# Patient Record
Sex: FEMALE | Race: WHITE | Employment: PART TIME | URBAN - METROPOLITAN AREA
[De-identification: names, ages, dates, MRNs, and addresses within clinical notes are randomized per-mention and may not be internally consistent; named-entity substitution may affect disease eponyms.]

---

## 2023-12-24 ENCOUNTER — APPOINTMENT (OUTPATIENT)
Dept: GENERAL RADIOLOGY | Age: 73
DRG: 070 | End: 2023-12-24
Payer: MEDICARE

## 2023-12-24 ENCOUNTER — HOSPITAL ENCOUNTER (INPATIENT)
Age: 73
LOS: 3 days | Discharge: HOME | DRG: 070 | End: 2023-12-28
Attending: EMERGENCY MEDICINE | Admitting: INTERNAL MEDICINE
Payer: MEDICARE

## 2023-12-24 ENCOUNTER — APPOINTMENT (OUTPATIENT)
Dept: CT IMAGING | Age: 73
DRG: 070 | End: 2023-12-24
Payer: MEDICARE

## 2023-12-24 DIAGNOSIS — G03.9 MENINGITIS: ICD-10-CM

## 2023-12-24 DIAGNOSIS — I67.83 POSTERIOR REVERSIBLE ENCEPHALOPATHY SYNDROME: ICD-10-CM

## 2023-12-24 DIAGNOSIS — R44.3 HALLUCINATION: Primary | ICD-10-CM

## 2023-12-24 LAB
ALBUMIN SERPL-MCNC: 4 G/DL (ref 3.5–5.2)
ALP SERPL-CCNC: 86 U/L (ref 35–104)
ALT SERPL-CCNC: 11 U/L (ref 0–32)
AMPHET UR QL SCN: NEGATIVE
ANION GAP SERPL CALCULATED.3IONS-SCNC: 12 MMOL/L (ref 7–16)
APAP SERPL-MCNC: <5 UG/ML (ref 10–30)
AST SERPL-CCNC: 14 U/L (ref 0–31)
B PARAP IS1001 DNA NPH QL NAA+NON-PROBE: NOT DETECTED
B PERT DNA SPEC QL NAA+PROBE: NOT DETECTED
BACTERIA URNS QL MICRO: ABNORMAL
BARBITURATES UR QL SCN: NEGATIVE
BASOPHILS # BLD: 0.01 K/UL (ref 0–0.2)
BASOPHILS NFR BLD: 0 % (ref 0–2)
BENZODIAZ UR QL: NEGATIVE
BILIRUB SERPL-MCNC: 0.3 MG/DL (ref 0–1.2)
BILIRUB UR QL STRIP: NEGATIVE
BUN SERPL-MCNC: 9 MG/DL (ref 6–23)
BUPRENORPHINE UR QL: NEGATIVE
C PNEUM DNA NPH QL NAA+NON-PROBE: NOT DETECTED
CALCIUM SERPL-MCNC: 9.1 MG/DL (ref 8.6–10.2)
CANNABINOIDS UR QL SCN: NEGATIVE
CHLORIDE SERPL-SCNC: 100 MMOL/L (ref 98–107)
CLARITY UR: CLEAR
CO2 SERPL-SCNC: 26 MMOL/L (ref 22–29)
COCAINE UR QL SCN: NEGATIVE
COLOR UR: YELLOW
CREAT SERPL-MCNC: 0.7 MG/DL (ref 0.5–1)
CRYSTALS URNS MICRO: ABNORMAL /HPF
EOSINOPHIL # BLD: 0.44 K/UL (ref 0.05–0.5)
EOSINOPHILS RELATIVE PERCENT: 6 % (ref 0–6)
ERYTHROCYTE [DISTWIDTH] IN BLOOD BY AUTOMATED COUNT: 12.9 % (ref 11.5–15)
ETHANOLAMINE SERPL-MCNC: <10 MG/DL
FENTANYL UR QL: NEGATIVE
FLUAV RNA NPH QL NAA+NON-PROBE: NOT DETECTED
FLUBV RNA NPH QL NAA+NON-PROBE: NOT DETECTED
GFR SERPL CREATININE-BSD FRML MDRD: >60 ML/MIN/1.73M2
GLUCOSE SERPL-MCNC: 118 MG/DL (ref 74–99)
GLUCOSE UR STRIP-MCNC: NEGATIVE MG/DL
HADV DNA NPH QL NAA+NON-PROBE: NOT DETECTED
HCOV 229E RNA NPH QL NAA+NON-PROBE: NOT DETECTED
HCOV HKU1 RNA NPH QL NAA+NON-PROBE: NOT DETECTED
HCOV NL63 RNA NPH QL NAA+NON-PROBE: NOT DETECTED
HCOV OC43 RNA NPH QL NAA+NON-PROBE: NOT DETECTED
HCT VFR BLD AUTO: 39.7 % (ref 34–48)
HGB BLD-MCNC: 14 G/DL (ref 11.5–15.5)
HGB UR QL STRIP.AUTO: ABNORMAL
HMPV RNA NPH QL NAA+NON-PROBE: NOT DETECTED
HPIV1 RNA NPH QL NAA+NON-PROBE: NOT DETECTED
HPIV2 RNA NPH QL NAA+NON-PROBE: NOT DETECTED
HPIV3 RNA NPH QL NAA+NON-PROBE: NOT DETECTED
HPIV4 RNA NPH QL NAA+NON-PROBE: NOT DETECTED
IMM GRANULOCYTES # BLD AUTO: 0.03 K/UL (ref 0–0.58)
IMM GRANULOCYTES NFR BLD: 0 % (ref 0–5)
KETONES UR STRIP-MCNC: NEGATIVE MG/DL
LACTATE BLDV-SCNC: 1.3 MMOL/L (ref 0.5–2.2)
LEUKOCYTE ESTERASE UR QL STRIP: ABNORMAL
LYMPHOCYTES NFR BLD: 1.53 K/UL (ref 1.5–4)
LYMPHOCYTES RELATIVE PERCENT: 21 % (ref 20–42)
M PNEUMO DNA NPH QL NAA+NON-PROBE: NOT DETECTED
MCH RBC QN AUTO: 30.4 PG (ref 26–35)
MCHC RBC AUTO-ENTMCNC: 35.3 G/DL (ref 32–34.5)
MCV RBC AUTO: 86.1 FL (ref 80–99.9)
METHADONE UR QL: NEGATIVE
MONOCYTES NFR BLD: 0.55 K/UL (ref 0.1–0.95)
MONOCYTES NFR BLD: 8 % (ref 2–12)
NEUTROPHILS NFR BLD: 64 % (ref 43–80)
NEUTS SEG NFR BLD: 4.59 K/UL (ref 1.8–7.3)
NITRITE UR QL STRIP: NEGATIVE
OPIATES UR QL SCN: NEGATIVE
OXYCODONE UR QL SCN: NEGATIVE
PCP UR QL SCN: NEGATIVE
PH UR STRIP: 6.5 [PH] (ref 5–9)
PLATELET # BLD AUTO: 252 K/UL (ref 130–450)
PMV BLD AUTO: 9.6 FL (ref 7–12)
POTASSIUM SERPL-SCNC: 3.4 MMOL/L (ref 3.5–5)
PROT SERPL-MCNC: 6.7 G/DL (ref 6.4–8.3)
PROT UR STRIP-MCNC: NEGATIVE MG/DL
RBC # BLD AUTO: 4.61 M/UL (ref 3.5–5.5)
RBC #/AREA URNS HPF: ABNORMAL /HPF
RSV RNA NPH QL NAA+NON-PROBE: NOT DETECTED
RV+EV RNA NPH QL NAA+NON-PROBE: NOT DETECTED
SALICYLATES SERPL-MCNC: <0.3 MG/DL (ref 0–30)
SARS-COV-2 RNA NPH QL NAA+NON-PROBE: NOT DETECTED
SODIUM SERPL-SCNC: 138 MMOL/L (ref 132–146)
SP GR UR STRIP: 1.01 (ref 1–1.03)
SPECIMEN DESCRIPTION: NORMAL
TEST INFORMATION: NORMAL
TOXIC TRICYCLIC SC,BLOOD: NEGATIVE
TROPONIN I SERPL HS-MCNC: 11 NG/L (ref 0–9)
UROBILINOGEN UR STRIP-ACNC: 0.2 EU/DL (ref 0–1)
WBC #/AREA URNS HPF: ABNORMAL /HPF
WBC OTHER # BLD: 7.2 K/UL (ref 4.5–11.5)

## 2023-12-24 PROCEDURE — 99285 EMERGENCY DEPT VISIT HI MDM: CPT

## 2023-12-24 PROCEDURE — 80179 DRUG ASSAY SALICYLATE: CPT

## 2023-12-24 PROCEDURE — 96375 TX/PRO/DX INJ NEW DRUG ADDON: CPT

## 2023-12-24 PROCEDURE — 96367 TX/PROPH/DG ADDL SEQ IV INF: CPT

## 2023-12-24 PROCEDURE — 85025 COMPLETE CBC W/AUTO DIFF WBC: CPT

## 2023-12-24 PROCEDURE — G0480 DRUG TEST DEF 1-7 CLASSES: HCPCS

## 2023-12-24 PROCEDURE — 80307 DRUG TEST PRSMV CHEM ANLYZR: CPT

## 2023-12-24 PROCEDURE — 81001 URINALYSIS AUTO W/SCOPE: CPT

## 2023-12-24 PROCEDURE — 009Y3ZX DRAINAGE OF LUMBAR SPINAL CORD, PERCUTANEOUS APPROACH, DIAGNOSTIC: ICD-10-PCS

## 2023-12-24 PROCEDURE — 71045 X-RAY EXAM CHEST 1 VIEW: CPT

## 2023-12-24 PROCEDURE — 0202U NFCT DS 22 TRGT SARS-COV-2: CPT

## 2023-12-24 PROCEDURE — 70450 CT HEAD/BRAIN W/O DYE: CPT

## 2023-12-24 PROCEDURE — 93005 ELECTROCARDIOGRAM TRACING: CPT | Performed by: EMERGENCY MEDICINE

## 2023-12-24 PROCEDURE — 83605 ASSAY OF LACTIC ACID: CPT

## 2023-12-24 PROCEDURE — 80053 COMPREHEN METABOLIC PANEL: CPT

## 2023-12-24 PROCEDURE — 96365 THER/PROPH/DIAG IV INF INIT: CPT

## 2023-12-24 PROCEDURE — 84484 ASSAY OF TROPONIN QUANT: CPT

## 2023-12-24 PROCEDURE — 80143 DRUG ASSAY ACETAMINOPHEN: CPT

## 2023-12-24 PROCEDURE — 6360000002 HC RX W HCPCS: Performed by: EMERGENCY MEDICINE

## 2023-12-24 RX ORDER — POTASSIUM CHLORIDE 7.45 MG/ML
10 INJECTION INTRAVENOUS ONCE
Status: COMPLETED | OUTPATIENT
Start: 2023-12-24 | End: 2023-12-24

## 2023-12-24 RX ADMIN — POTASSIUM CHLORIDE 10 MEQ: 7.46 INJECTION, SOLUTION INTRAVENOUS at 21:52

## 2023-12-24 ASSESSMENT — LIFESTYLE VARIABLES
HOW MANY STANDARD DRINKS CONTAINING ALCOHOL DO YOU HAVE ON A TYPICAL DAY: 3 OR 4
HOW OFTEN DO YOU HAVE A DRINK CONTAINING ALCOHOL: 2-4 TIMES A MONTH

## 2023-12-24 NOTE — ED PROVIDER NOTES
Department of Emergency Medicine   ED  Provider Note  Admit Date/RoomTime: 12/24/2023  8:00 PM  ED Room: [facility]          History of Present Illness:  12/24/23, Time: 4:38 PM EST  Chief Complaint   Patient presents with    Medication Reaction     Having visual hallucinations after being started on Augmentin. She also got a rash on her chest after starting Augmentin                 Hilary Sahni is a 72 y.o. female presenting to the ED for hallucinations. Patient's been hallucinating the past 3 days. She is seeing things that are not there. She says she saw cardboard floating this morning. She says she thought her pillow was walking across the. She says she thought she saw 3G written across the wall when walking to the ER. She never had this before. She was started on Augmentin 2 and half days ago secondary to a sinus infection. She said it started after the first dose of Augmentin. She stopped taking Augmentin yesterday. No fevers or chills. No neck pain or stiffness. No nausea or vomiting. She says she has an area of redness over her anterior chest was also started after the Augmentin. Spouse is bedside, states the patient is otherwise at her baseline and does not appear any different. He does not feel that she is confused at this time. No other symptoms or complaints. Patient took no antipyretics prior to arrival.    Review of Systems:   Pertinent positives and negatives are stated within HPI, all other systems reviewed and are negative.        --------------------------------------------- PAST HISTORY ---------------------------------------------  Past Medical History:  has no past medical history on file. Past Surgical History:  has no past surgical history on file. Social History:  reports that she has never smoked. She has never used smokeless tobacco. She reports current alcohol use. She reports that she does not use drugs. Family History: family history is not on file. . Unless /HPF    Crystals, UA 2+ CALCIUM OXALATE (A) None /HPF    Bacteria, UA 2+ (A) None   EKG 12 Lead   Result Value Ref Range    Ventricular Rate 68 BPM    Atrial Rate 68 BPM    P-R Interval 130 ms    QRS Duration 72 ms    Q-T Interval 386 ms    QTc Calculation (Bazett) 410 ms    P Axis 46 degrees    R Axis 15 degrees    T Axis -3 degrees   ,       RADIOLOGY:  Interpreted by Radiologist unless otherwise specified  CT HEAD WO CONTRAST   Final Result   No acute intracranial abnormality.         XR CHEST PORTABLE   Final Result   No acute process.               ------------------------- NURSING NOTES AND VITALS REVIEWED ---------------------------   The nursing notes within the ED encounter and vital signs as below have been reviewed by myself  BP (!) 147/81   Pulse 71   Temp 97.2 °F (36.2 °C)   Resp 20   Ht 1.499 m (4' 11\")   Wt 61.2 kg (135 lb)   SpO2 98%   BMI 27.27 kg/m²     Oxygen Saturation Interpretation: Normal    The patient’s available past medical records and past encounters were reviewed.        ------------------------------ ED COURSE/MEDICAL DECISION MAKING----------------------  Medications   potassium chloride 10 mEq/100 mL IVPB (Peripheral Line) (0 mEq IntraVENous Stopped 12/24/23 2319)         Medical Decision Making:     Patient presents with hallucinations.  Concern for tumor, medication reaction, hypoglycemia, infection, or other pathologies.  She was hemodynamically stable, afebrile, overall well-appearing on arrival.  NIH was 0.  She had no confusion.  She had a vague area of erythema over her sternum, but no true rash otherwise.    EKG interpreted by me shows sinus rhythm, rate 68, no STEMI, no ischemic change    Laboratories shows normal CBC, CMP, serum and urine drug screen, lactic negative, urinalysis not consistent with UTI    Chest x-ray inter myself shows no pneumothorax, radiology agrees, CT reviewed as above    MRI is pending at this time.  If MRI normal, patient is medically cleared.

## 2023-12-25 ENCOUNTER — APPOINTMENT (OUTPATIENT)
Dept: MRI IMAGING | Age: 73
DRG: 070 | End: 2023-12-25
Payer: MEDICARE

## 2023-12-25 ENCOUNTER — APPOINTMENT (OUTPATIENT)
Dept: CT IMAGING | Age: 73
DRG: 070 | End: 2023-12-25
Payer: MEDICARE

## 2023-12-25 PROBLEM — A87.9 VIRAL MENINGITIS: Status: ACTIVE | Noted: 2023-12-25

## 2023-12-25 LAB
ANION GAP SERPL CALCULATED.3IONS-SCNC: 13 MMOL/L (ref 7–16)
APPEARANCE CSF: CLEAR
APPEARANCE CSF: CLEAR
BASOPHILS # BLD: 0.02 K/UL (ref 0–0.2)
BASOPHILS NFR BLD: 0 % (ref 0–2)
BUN SERPL-MCNC: 8 MG/DL (ref 6–23)
C GATTII+NEOFOR DNA CSF QL NAA+NON-PROBE: NOT DETECTED
CALCIUM SERPL-MCNC: 8.8 MG/DL (ref 8.6–10.2)
CHLORIDE SERPL-SCNC: 99 MMOL/L (ref 98–107)
CLOT CHECK: ABNORMAL
CLOT CHECK: ABNORMAL
CMV DNA CSF QL NAA+NON-PROBE: NOT DETECTED
CO2 SERPL-SCNC: 23 MMOL/L (ref 22–29)
COLOR CSF: ABNORMAL
COLOR CSF: ABNORMAL
CREAT SERPL-MCNC: 0.6 MG/DL (ref 0.5–1)
E COLI K1 DNA CSF QL NAA+NON-PROBE: NOT DETECTED
EOSINOPHIL # BLD: 0.31 K/UL (ref 0.05–0.5)
EOSINOPHILS RELATIVE PERCENT: 5 % (ref 0–6)
ERYTHROCYTE [DISTWIDTH] IN BLOOD BY AUTOMATED COUNT: 13 % (ref 11.5–15)
EV RNA CSF QL NAA+NON-PROBE: NOT DETECTED
GFR SERPL CREATININE-BSD FRML MDRD: >60 ML/MIN/1.73M2
GLUCOSE CSF-MCNC: 45 MG/DL (ref 40–70)
GLUCOSE SERPL-MCNC: 89 MG/DL (ref 74–99)
GP B STREP DNA CSF QL NAA+NON-PROBE: NOT DETECTED
HAEM INFLU DNA CSF QL NAA+NON-PROBE: NOT DETECTED
HCT VFR BLD AUTO: 37.7 % (ref 34–48)
HGB BLD-MCNC: 13.1 G/DL (ref 11.5–15.5)
HHV6 DNA CSF QL NAA+NON-PROBE: NOT DETECTED
HSV1 DNA CSF QL NAA+NON-PROBE: NOT DETECTED
HSV2 DNA CSF QL NAA+NON-PROBE: NOT DETECTED
IMM GRANULOCYTES # BLD AUTO: 0.08 K/UL (ref 0–0.58)
IMM GRANULOCYTES NFR BLD: 1 % (ref 0–5)
L MONOCYTOG DNA CSF QL NAA+NON-PROBE: NOT DETECTED
LACTATE BLDV-SCNC: 0.9 MMOL/L (ref 0.5–1.9)
LYMPHOCYTES NFR BLD: 1.75 K/UL (ref 1.5–4)
LYMPHOCYTES RELATIVE PERCENT: 25 % (ref 20–42)
MCH RBC QN AUTO: 30.3 PG (ref 26–35)
MCHC RBC AUTO-ENTMCNC: 34.7 G/DL (ref 32–34.5)
MCV RBC AUTO: 87.1 FL (ref 80–99.9)
MONOCYTES NFR BLD: 0.45 K/UL (ref 0.1–0.95)
MONOCYTES NFR BLD: 7 % (ref 2–12)
MONOCYTES, CSF: 98 % (ref 15–45)
N MEN DNA CSF QL NAA+NON-PROBE: NOT DETECTED
NEUTROPHILS NFR BLD: 62 % (ref 43–80)
NEUTROPHILS NFR CSF: 2 % (ref 0–6)
NEUTS SEG NFR BLD: 4.27 K/UL (ref 1.8–7.3)
NUC CELL # FLD MANUAL: 56 CELLS/UL (ref 0–5)
NUC CELL # FLD MANUAL: 63 CELLS/UL (ref 0–5)
PARECHOVIRUS A RNA CSF QL NAA+NON-PROBE: NOT DETECTED
PLATELET CONFIRMATION: NORMAL
PLATELET, FLUORESCENCE: 213 K/UL (ref 130–450)
PMV BLD AUTO: 10.1 FL (ref 7–12)
POTASSIUM SERPL-SCNC: 3.7 MMOL/L (ref 3.5–5)
PROT CSF-MCNC: 193.4 MG/DL (ref 15–40)
PROT CSF-MCNC: 197.3 MG/DL (ref 15–40)
RBC # BLD AUTO: 4.33 M/UL (ref 3.5–5.5)
RBC # FLD MANUAL: <2000 CELLS/UL
RBC # FLD MANUAL: <2000 CELLS/UL
S PNEUM DNA CSF QL NAA+NON-PROBE: NOT DETECTED
SODIUM SERPL-SCNC: 135 MMOL/L (ref 132–146)
SPECIMEN DESCRIPTION: NORMAL
SPECIMEN VOL CSF: ABNORMAL ML
SPECIMEN VOL CSF: ABNORMAL ML
TUBE # CSF: 1
TUBE # CSF: 4
VZV DNA CSF QL NAA+NON-PROBE: NOT DETECTED
WBC OTHER # BLD: 6.9 K/UL (ref 4.5–11.5)

## 2023-12-25 PROCEDURE — 2580000003 HC RX 258: Performed by: STUDENT IN AN ORGANIZED HEALTH CARE EDUCATION/TRAINING PROGRAM

## 2023-12-25 PROCEDURE — 80048 BASIC METABOLIC PNL TOTAL CA: CPT

## 2023-12-25 PROCEDURE — 6360000004 HC RX CONTRAST MEDICATION: Performed by: RADIOLOGY

## 2023-12-25 PROCEDURE — 83605 ASSAY OF LACTIC ACID: CPT

## 2023-12-25 PROCEDURE — 86592 SYPHILIS TEST NON-TREP QUAL: CPT

## 2023-12-25 PROCEDURE — 87070 CULTURE OTHR SPECIMN AEROBIC: CPT

## 2023-12-25 PROCEDURE — 36415 COLL VENOUS BLD VENIPUNCTURE: CPT

## 2023-12-25 PROCEDURE — 87015 SPECIMEN INFECT AGNT CONCNTJ: CPT

## 2023-12-25 PROCEDURE — 6360000002 HC RX W HCPCS: Performed by: STUDENT IN AN ORGANIZED HEALTH CARE EDUCATION/TRAINING PROGRAM

## 2023-12-25 PROCEDURE — 87205 SMEAR GRAM STAIN: CPT

## 2023-12-25 PROCEDURE — 70498 CT ANGIOGRAPHY NECK: CPT

## 2023-12-25 PROCEDURE — 70496 CT ANGIOGRAPHY HEAD: CPT

## 2023-12-25 PROCEDURE — A9577 INJ MULTIHANCE: HCPCS | Performed by: RADIOLOGY

## 2023-12-25 PROCEDURE — 88108 CYTOPATH CONCENTRATE TECH: CPT

## 2023-12-25 PROCEDURE — 84157 ASSAY OF PROTEIN OTHER: CPT

## 2023-12-25 PROCEDURE — 87483 CNS DNA AMP PROBE TYPE 12-25: CPT

## 2023-12-25 PROCEDURE — 70553 MRI BRAIN STEM W/O & W/DYE: CPT

## 2023-12-25 PROCEDURE — 85025 COMPLETE CBC W/AUTO DIFF WBC: CPT

## 2023-12-25 PROCEDURE — 89051 BODY FLUID CELL COUNT: CPT

## 2023-12-25 PROCEDURE — 87040 BLOOD CULTURE FOR BACTERIA: CPT

## 2023-12-25 PROCEDURE — 2060000000 HC ICU INTERMEDIATE R&B

## 2023-12-25 PROCEDURE — 82945 GLUCOSE OTHER FLUID: CPT

## 2023-12-25 RX ORDER — DEXTROSE MONOHYDRATE 100 MG/ML
INJECTION, SOLUTION INTRAVENOUS CONTINUOUS PRN
Status: DISCONTINUED | OUTPATIENT
Start: 2023-12-25 | End: 2023-12-25 | Stop reason: SDUPTHER

## 2023-12-25 RX ORDER — HYDRALAZINE HYDROCHLORIDE 20 MG/ML
10 INJECTION INTRAMUSCULAR; INTRAVENOUS EVERY 6 HOURS PRN
Status: DISCONTINUED | OUTPATIENT
Start: 2023-12-25 | End: 2023-12-28 | Stop reason: HOSPADM

## 2023-12-25 RX ORDER — ONDANSETRON 4 MG/1
4 TABLET, ORALLY DISINTEGRATING ORAL EVERY 8 HOURS PRN
Status: DISCONTINUED | OUTPATIENT
Start: 2023-12-25 | End: 2023-12-28 | Stop reason: HOSPADM

## 2023-12-25 RX ORDER — ONDANSETRON 2 MG/ML
4 INJECTION INTRAMUSCULAR; INTRAVENOUS EVERY 6 HOURS PRN
Status: DISCONTINUED | OUTPATIENT
Start: 2023-12-25 | End: 2023-12-28 | Stop reason: HOSPADM

## 2023-12-25 RX ORDER — SODIUM CHLORIDE 9 MG/ML
INJECTION, SOLUTION INTRAVENOUS PRN
Status: DISCONTINUED | OUTPATIENT
Start: 2023-12-25 | End: 2023-12-28 | Stop reason: HOSPADM

## 2023-12-25 RX ORDER — SODIUM CHLORIDE 9 MG/ML
INJECTION, SOLUTION INTRAVENOUS CONTINUOUS
Status: DISCONTINUED | OUTPATIENT
Start: 2023-12-25 | End: 2023-12-27

## 2023-12-25 RX ORDER — FENTANYL CITRATE 50 UG/ML
50 INJECTION, SOLUTION INTRAMUSCULAR; INTRAVENOUS ONCE
Status: DISCONTINUED | OUTPATIENT
Start: 2023-12-25 | End: 2023-12-25

## 2023-12-25 RX ORDER — DEXTROSE MONOHYDRATE 100 MG/ML
INJECTION, SOLUTION INTRAVENOUS CONTINUOUS PRN
Status: DISCONTINUED | OUTPATIENT
Start: 2023-12-25 | End: 2023-12-28 | Stop reason: HOSPADM

## 2023-12-25 RX ORDER — METOPROLOL TARTRATE 1 MG/ML
10 INJECTION, SOLUTION INTRAVENOUS EVERY 6 HOURS PRN
Status: DISCONTINUED | OUTPATIENT
Start: 2023-12-25 | End: 2023-12-25

## 2023-12-25 RX ORDER — MIDAZOLAM HYDROCHLORIDE 2 MG/2ML
1 INJECTION, SOLUTION INTRAMUSCULAR; INTRAVENOUS ONCE
Status: COMPLETED | OUTPATIENT
Start: 2023-12-25 | End: 2023-12-25

## 2023-12-25 RX ORDER — SODIUM CHLORIDE 0.9 % (FLUSH) 0.9 %
5-40 SYRINGE (ML) INJECTION PRN
Status: DISCONTINUED | OUTPATIENT
Start: 2023-12-25 | End: 2023-12-28 | Stop reason: HOSPADM

## 2023-12-25 RX ORDER — SODIUM CHLORIDE 0.9 % (FLUSH) 0.9 %
5-40 SYRINGE (ML) INJECTION EVERY 12 HOURS SCHEDULED
Status: DISCONTINUED | OUTPATIENT
Start: 2023-12-25 | End: 2023-12-28 | Stop reason: HOSPADM

## 2023-12-25 RX ORDER — LABETALOL HYDROCHLORIDE 5 MG/ML
10 INJECTION, SOLUTION INTRAVENOUS EVERY 6 HOURS PRN
Status: DISCONTINUED | OUTPATIENT
Start: 2023-12-25 | End: 2023-12-28 | Stop reason: HOSPADM

## 2023-12-25 RX ORDER — POLYETHYLENE GLYCOL 3350 17 G/17G
17 POWDER, FOR SOLUTION ORAL DAILY PRN
Status: DISCONTINUED | OUTPATIENT
Start: 2023-12-25 | End: 2023-12-28 | Stop reason: HOSPADM

## 2023-12-25 RX ADMIN — CEFTRIAXONE SODIUM 2000 MG: 2 INJECTION, POWDER, FOR SOLUTION INTRAMUSCULAR; INTRAVENOUS at 13:56

## 2023-12-25 RX ADMIN — ACYCLOVIR SODIUM 500 MG: 50 INJECTION, SOLUTION INTRAVENOUS at 15:35

## 2023-12-25 RX ADMIN — MIDAZOLAM 1 MG: 1 INJECTION INTRAMUSCULAR; INTRAVENOUS at 12:50

## 2023-12-25 RX ADMIN — AMPICILLIN SODIUM 2000 MG: 2 INJECTION, POWDER, FOR SOLUTION INTRAVENOUS at 14:07

## 2023-12-25 RX ADMIN — VANCOMYCIN HYDROCHLORIDE 1250 MG: 10 INJECTION, POWDER, LYOPHILIZED, FOR SOLUTION INTRAVENOUS at 15:25

## 2023-12-25 RX ADMIN — IOPAMIDOL 75 ML: 755 INJECTION, SOLUTION INTRAVENOUS at 17:17

## 2023-12-25 RX ADMIN — GADOBENATE DIMEGLUMINE 12 ML: 529 INJECTION, SOLUTION INTRAVENOUS at 07:54

## 2023-12-25 NOTE — PROCEDURES
Lumbar Puncture Procedure Note    Indication: Suspected meningitis    Consent: The patient provided verbal consent for this procedure. Procedure: The patient was placed in the right lateral decubitus position and the appropriate landmarks were identified. The area was prepped and draped in the usual sterile fashion. Anesthesia was obtained using 3 cc of 1% Lidocaine without epinephrine. A spinal needle was inserted at the L4- L5 level with the stylet in place until spinal fluid was returned. Opening pressure was not measured. At this point 6.0 cc of straw colored spinal fluid was obtained and sent for appropriate testing. The stylet was then replaced and the needle was withdrawn. A sterile dressing was placed over the site and the patient was placed in the supine position. The patient tolerated the procedure well.     Complications: None

## 2023-12-25 NOTE — CONSULTS
Department of Internal Medicine  Infectious Diseases   Consult Note      Reason for Consult:  Meningitis       Requesting Physician:  Dr Nguyen Meth:      This is a 72 yrs old female presented to the ER with visual hallucination . Pt stated that she recently visited [facility] ( for 2 weeks ) and came back home on Dec 6 th. Pt remembered feeling sick ( chills , weakness ) on the way back home ( on Dec 5 th ) - she tested positive for COVID 19 on Dec 7 th - treated with steroid ( sounds like medrol dose pack ) - she felt a bit better . She subsequently visited [facility] on Friday - she was prescribed augmentin for \" sinus infection \". She developed upset stomach, nausea,vomiting . Reported headache and visual hallucination . 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 No pertinent past medical history. Past Surgical History:      History reviewed. No pertinent surgical history.       Current Medications:      Current Facility-Administered Medications   Medication Dose Route Frequency Provider Last Rate Last Admin    vancomycin (VANCOCIN) 1,250 mg in sodium chloride 0.9 % 250 mL IVPB  20 mg/kg IntraVENous Once Jeanmarie, Alcides, DO        cefTRIAXone (ROCEPHIN) 2,000 mg in sterile water 20 mL IV syringe  2,000 mg IntraVENous Once Jeanmarie, Alcides, DO        ampicillin (OMNIPEN) 2,000 mg in sodium chloride 0.9 % 100 mL IVPB ([id number])  2,000 mg IntraVENous Once Jeanmarie, Alcides, DO        acyclovir (ZOVIRAX) 500 mg in sodium chloride 0.9 % 100 mL IVPB  10 mg/kg (Ideal) IntraVENous Once Jeanmarie, Alcides, DO        fentaNYL (SUBLIMAZE) injection 50 mcg  50 mcg IntraVENous Once Jeanmarie, Alcides, DO        midazolam PF (VERSED) injection 1 mg  1 mg IntraVENous Once Charmayne Gu, DO         No

## 2023-12-25 NOTE — H&P
[facility]  Internal Medicine Residency Program  History and Physical    Patient:  Romana Tanner 73 y.o. female   MRN: [id number]       Date of Service: 12/25/2023        Chief complaint: had concerns including Medication Reaction (Having visual hallucinations after being started on Augmentin. She also got a rash on her chest after starting Augmentin ).    History of Present Illness   The patient is a 73 y.o. female , presented with visual hallucinations, HA, feeling tired x 3 days. Pt believes it may be caused by Augmentin that she was prescribed for a sinus infection as the hallucinations started after. Pt also had vomiting, abdominal pain and diarrhea when she took it. (Seen at [facility]) Stopped taking Augmentin 12/24.   Visual hallucinations consist of writing on the walls, bright colors, words, numbers, and designs, she has not experienced this before, knows the hallucinations are not real. Does not have mental illness history.     Recent travel to [facility] for 2 weeks, felt sick on the way back home December 5th, subsequently tested positive for COVID-19 December 7th, was given a Z-pack.   Occasional drinker, 1-2 beers weekly. Works at [employer]. Not a smoker. No drug use.     Takes 2 blood pressure medications OP, she believes they are propranolol and amlodipine- to bring in medications to confirm as they're not on chart review. Did not take either BP pills today. Has struggled to control her BP for awhile. Knows her BP is high when she gets HA and blurred vision, has been meaning to get her BP meds adjusted but hasn't been to see her PCP.     No fevers, chills, CP, SOB, cough, N/V/D since friday, no abdominal pain, no SI/HI, no auditory hallucinations.     ED Course: Pt hemodynamically stable, afebrile. Concern for meningitis, LP performed, CSF studies sent. Pt tolerated well. Consulted ID and Neurology. Started on empiric antibiotic coverage vanc, rocephin, ampicillin and acyclovir.  Plan:    Visual hallucinations 2/2 meningitis vs antibiotic intolerance vs SAH  UDS SDS negative. CT head no acute. MRI leptomeningeal enhancement meningitis vs underlying subarachnoid blood/proteinaceous material.   Neurology, ID consulted in ED. S/p LP 12/25. Continue empiric antibiotics per ID. Follow CSF studies, remainder of infectious workup. Afebrile, no leukocytosis. Meningitis panel negative. Xanthrochromia, increased protein, nl glucose, glucose CSF/serum ratio ~0.5, monocyte predominance 98, with 63 WBCs in Tube 1, suggestive of SAH. Follow CTA head and neck. HTN    to bring in home medications to verify - possible on amlodipine and propanolol. PRNs for now. Hematuria, bacteruria, calcium oxalate crystals   Asymptomatic. PT/OT evaluation: not indicated at this time   DVT prophylaxis: PCDs  GI prophylaxis: not indicated   Diet:   ADULT DIET; Regular   Bowel regimen: PRN   Pain management: as needed  Code status: Full Code   Disposition: Admit to [place] [facility]   Family: updated as available    Karlos Caceres MD, PGY-1   Attending physician: Dr. Jorge Rangel  Internal Medicine Residency / [place] to resident note  Attending Physician Statement:  Andrés Bergeron M.D., F.A.C.P. Hospital charts reviewed, including other providers notes, relevant labs and imaging. Coordinating care with residents, other providers and/or counseling patient    I have seen patient/discussed the history and PE with the resident, and I agree with workup/plan and orders as documented by the resident.   (billing based on complexity of Medical decision making)  My Assessment as follows:      HTN hx    Travelled back from [place]- start feeling \"sick\" early Dec   Tested covid+  Zpack given-- then claims feeling back to normal  Till one week ago-- sinus pressure/pain  [facility] doc gave augmentin- took for 3 days  Notices \"new colors floatering around\"-- b/l eye
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
## 2023-12-25 NOTE — ED NOTES
Patient states to this RN that is experiencing visual hallucinations. Patient states she knows that they are not real. Patient states she is not seeing people, but is seeing flashes of numbers, letters, words. Patient denies any SI/HI behavior.

## 2023-12-26 ENCOUNTER — APPOINTMENT (OUTPATIENT)
Dept: CT IMAGING | Age: 73
DRG: 070 | End: 2023-12-26
Attending: PSYCHIATRY & NEUROLOGY
Payer: MEDICARE

## 2023-12-26 PROBLEM — R44.3 HALLUCINATION: Status: ACTIVE | Noted: 2023-12-26

## 2023-12-26 LAB
ANION GAP SERPL CALCULATED.3IONS-SCNC: 16 MMOL/L (ref 7–16)
BASOPHILS # BLD: 0.03 K/UL (ref 0–0.2)
BASOPHILS NFR BLD: 0 % (ref 0–2)
BUN SERPL-MCNC: 8 MG/DL (ref 6–23)
CALCIUM SERPL-MCNC: 8.9 MG/DL (ref 8.6–10.2)
CHLORIDE SERPL-SCNC: 99 MMOL/L (ref 98–107)
CO2 SERPL-SCNC: 23 MMOL/L (ref 22–29)
CREAT SERPL-MCNC: 0.6 MG/DL (ref 0.5–1)
EKG ATRIAL RATE: 68 BPM
EKG P AXIS: 46 DEGREES
EKG P-R INTERVAL: 130 MS
EKG Q-T INTERVAL: 386 MS
EKG QRS DURATION: 72 MS
EKG QTC CALCULATION (BAZETT): 410 MS
EKG R AXIS: 15 DEGREES
EKG T AXIS: -3 DEGREES
EKG VENTRICULAR RATE: 68 BPM
EOSINOPHIL # BLD: 0.17 K/UL (ref 0.05–0.5)
EOSINOPHILS RELATIVE PERCENT: 2 % (ref 0–6)
ERYTHROCYTE [DISTWIDTH] IN BLOOD BY AUTOMATED COUNT: 12.8 % (ref 11.5–15)
GFR SERPL CREATININE-BSD FRML MDRD: >60 ML/MIN/1.73M2
GLUCOSE SERPL-MCNC: 120 MG/DL (ref 74–99)
HCT VFR BLD AUTO: 38.7 % (ref 34–48)
HGB BLD-MCNC: 13.5 G/DL (ref 11.5–15.5)
IMM GRANULOCYTES # BLD AUTO: 0.04 K/UL (ref 0–0.58)
IMM GRANULOCYTES NFR BLD: 1 % (ref 0–5)
LYMPHOCYTES NFR BLD: 1.66 K/UL (ref 1.5–4)
LYMPHOCYTES RELATIVE PERCENT: 23 % (ref 20–42)
MCH RBC QN AUTO: 30.1 PG (ref 26–35)
MCHC RBC AUTO-ENTMCNC: 34.9 G/DL (ref 32–34.5)
MCV RBC AUTO: 86.4 FL (ref 80–99.9)
MONOCYTES NFR BLD: 0.38 K/UL (ref 0.1–0.95)
MONOCYTES NFR BLD: 5 % (ref 2–12)
MONOCYTES, CSF: 95 % (ref 15–45)
NEUTROPHILS NFR BLD: 68 % (ref 43–80)
NEUTROPHILS NFR CSF: 5 % (ref 0–6)
NEUTS SEG NFR BLD: 4.94 K/UL (ref 1.8–7.3)
PLATELET # BLD AUTO: 239 K/UL (ref 130–450)
PMV BLD AUTO: 10 FL (ref 7–12)
POTASSIUM SERPL-SCNC: 3.4 MMOL/L (ref 3.5–5)
RBC # BLD AUTO: 4.48 M/UL (ref 3.5–5.5)
SODIUM SERPL-SCNC: 138 MMOL/L (ref 132–146)
WBC OTHER # BLD: 7.2 K/UL (ref 4.5–11.5)

## 2023-12-26 PROCEDURE — 80048 BASIC METABOLIC PNL TOTAL CA: CPT

## 2023-12-26 PROCEDURE — 6370000000 HC RX 637 (ALT 250 FOR IP)

## 2023-12-26 PROCEDURE — 2060000000 HC ICU INTERMEDIATE R&B

## 2023-12-26 PROCEDURE — 6360000002 HC RX W HCPCS

## 2023-12-26 PROCEDURE — 2580000003 HC RX 258

## 2023-12-26 PROCEDURE — 85025 COMPLETE CBC W/AUTO DIFF WBC: CPT

## 2023-12-26 PROCEDURE — 6360000004 HC RX CONTRAST MEDICATION: Performed by: RADIOLOGY

## 2023-12-26 PROCEDURE — 70496 CT ANGIOGRAPHY HEAD: CPT

## 2023-12-26 PROCEDURE — 87086 URINE CULTURE/COLONY COUNT: CPT

## 2023-12-26 PROCEDURE — 99223 1ST HOSP IP/OBS HIGH 75: CPT | Performed by: INTERNAL MEDICINE

## 2023-12-26 PROCEDURE — 93010 ELECTROCARDIOGRAM REPORT: CPT | Performed by: INTERNAL MEDICINE

## 2023-12-26 PROCEDURE — 99223 1ST HOSP IP/OBS HIGH 75: CPT | Performed by: PSYCHIATRY & NEUROLOGY

## 2023-12-26 RX ORDER — LISINOPRIL 10 MG/1
5 TABLET ORAL DAILY
Status: DISCONTINUED | OUTPATIENT
Start: 2023-12-26 | End: 2023-12-28

## 2023-12-26 RX ORDER — PROPRANOLOL HCL 60 MG
60 CAPSULE, EXTENDED RELEASE 24HR ORAL DAILY
Status: DISCONTINUED | OUTPATIENT
Start: 2023-12-26 | End: 2023-12-28 | Stop reason: HOSPADM

## 2023-12-26 RX ORDER — HEPARIN SODIUM 1000 [USP'U]/ML
60 INJECTION, SOLUTION INTRAVENOUS; SUBCUTANEOUS ONCE
Status: CANCELLED | OUTPATIENT
Start: 2023-12-26 | End: 2023-12-26

## 2023-12-26 RX ORDER — HEPARIN SODIUM 10000 [USP'U]/100ML
5-30 INJECTION, SOLUTION INTRAVENOUS CONTINUOUS
Status: CANCELLED | OUTPATIENT
Start: 2023-12-26

## 2023-12-26 RX ADMIN — HYDRALAZINE HYDROCHLORIDE 10 MG: 20 INJECTION INTRAMUSCULAR; INTRAVENOUS at 21:31

## 2023-12-26 RX ADMIN — SODIUM CHLORIDE: 9 INJECTION, SOLUTION INTRAVENOUS at 02:17

## 2023-12-26 RX ADMIN — VANCOMYCIN HYDROCHLORIDE 1000 MG: 1 INJECTION, POWDER, LYOPHILIZED, FOR SOLUTION INTRAVENOUS at 05:13

## 2023-12-26 RX ADMIN — AMPICILLIN SODIUM 2000 MG: 2 INJECTION, POWDER, FOR SOLUTION INTRAMUSCULAR; INTRAVENOUS at 00:09

## 2023-12-26 RX ADMIN — LISINOPRIL 5 MG: 10 TABLET ORAL at 12:47

## 2023-12-26 RX ADMIN — PROPRANOLOL HYDROCHLORIDE 60 MG: 60 CAPSULE, EXTENDED RELEASE ORAL at 12:47

## 2023-12-26 RX ADMIN — AMPICILLIN SODIUM 2000 MG: 2 INJECTION, POWDER, FOR SOLUTION INTRAMUSCULAR; INTRAVENOUS at 04:50

## 2023-12-26 RX ADMIN — ACYCLOVIR SODIUM 600 MG: 50 INJECTION, SOLUTION INTRAVENOUS at 04:07

## 2023-12-26 RX ADMIN — ONDANSETRON 4 MG: 2 INJECTION INTRAMUSCULAR; INTRAVENOUS at 18:18

## 2023-12-26 RX ADMIN — IOPAMIDOL 60 ML: 755 INJECTION, SOLUTION INTRAVENOUS at 16:59

## 2023-12-26 RX ADMIN — Medication 10 ML: at 21:27

## 2023-12-26 RX ADMIN — AMPICILLIN SODIUM 2000 MG: 2 INJECTION, POWDER, FOR SOLUTION INTRAMUSCULAR; INTRAVENOUS at 10:00

## 2023-12-26 RX ADMIN — HYDRALAZINE HYDROCHLORIDE 10 MG: 20 INJECTION INTRAMUSCULAR; INTRAVENOUS at 13:43

## 2023-12-26 RX ADMIN — CEFTRIAXONE SODIUM 2000 MG: 2 INJECTION, POWDER, FOR SOLUTION INTRAMUSCULAR; INTRAVENOUS at 02:03

## 2023-12-26 NOTE — PROGRESS NOTES
Department of Internal Medicine  Infectious Diseases  Progress  Note      C/C: ?   Meningitis     Denies fever or chills  Headache +  Visual hallucination improving   Afebrile         Current Facility-Administered Medications   Medication Dose Route Frequency Provider Last Rate Last Admin    propranolol (INDERAL LA) extended release capsule 60 mg  60 mg Oral Daily Daryl Daley MD        lisinopril (PRINIVIL;ZESTRIL) tablet 5 mg  5 mg Oral Daily Daryl Daley MD        sodium chloride flush 0.9 % injection 5-40 mL  5-40 mL IntraVENous 2 times per day Pau Tilley MD        sodium chloride flush 0.9 % injection 5-40 mL  5-40 mL IntraVENous PRN Pau Tilley MD        0.9 % sodium chloride infusion   IntraVENous PRN Pau Tilley MD        ondansetron (ZOFRAN-ODT) disintegrating tablet 4 mg  4 mg Oral Q8H PRN Pau Tilley MD        Or    ondansetron (ZOFRAN) injection 4 mg  4 mg IntraVENous Q6H PRN Pau Tilley MD        polyethylene glycol (GLYCOLAX) packet 17 g  17 g Oral Daily PRN Pau Tilley MD        glucose chewable tablet 16 g  4 tablet Oral PRN Pau Tilley MD        dextrose bolus 10% 125 mL  125 mL IntraVENous PRN Pau Tilley MD        Or    dextrose bolus 10% 250 mL  250 mL IntraVENous PRN Pau Tilley MD        glucagon injection 1 mg  1 mg SubCUTAneous PRN Pau Tilley MD        dextrose 10 % infusion   IntraVENous Continuous PRN Pau Tilley MD        hydrALAZINE (APRESOLINE) injection 10 mg  10 mg IntraVENous Q6H PRN Pau Tilley MD        labetalol (NORMODYNE;TRANDATE) injection 10 mg  10 mg IntraVENous Q6H PRN Cullen Mcgrath MD        acyclovir (ZOVIRAX) 600 mg in sodium chloride 0.9 % 100 mL IVPB  10 mg/kg IntraVENous Q8H Prashanth Vito Gu MD   Stopped at 12/26/23 0528    ampicillin (OMNIPEN) 2,000 mg in sodium chloride 0.9 % 100 mL IVPB ([id number])  2,000 mg IntraVENous 6 times per day Pete Darnell  mL/hr at 12/26/23 1000 2,000 mg at 12/26/23 1000    vancomycin (VANCOCIN) 1,000 mg in sodium chloride 0.9 % 250 mL IVPB ([id number])  15 mg/kg IntraVENous Q12H Pete Darnell MD   Stopped at 12/26/23 0725    cefTRIAXone (ROCEPHIN) 2,000 mg in sterile water 20 mL IV syringe  2,000 mg IntraVENous Q12H Pete Darnell MD   2,000 mg at 12/26/23 0203    0.9 % sodium chloride infusion   IntraVENous Continuous Pete Darnell MD 65 mL/hr at 12/26/23 0217 New Bag at 12/26/23 0217     No current outpatient medications on file.         REVIEW OF SYSTEMS:    CONSTITUTIONAL:  Denies fever, chill or rigors.  HEENT: headache   RESPIRATORY: denies cough, shortness of breath, sputum expectoration.  CARDIOVASCULAR:  Denies palpitation or chest pain   GASTROINTESTINAL:  Denies abdomen pain, diarrhea or constipation,, nausea or vomiting.  GENITOURINARY:  Denies burning urination or frequency of urination  INTEGUMENT: rash   HEMATOLOGIC/LYMPHATIC:  Denies lymph node swelling, gum bleeding or easy bruising.  MUSCULOSKELETAL:  Denies leg pain , joint pain , joint swelling  NEUROLOGICAL:  hallucination      PHYSICAL EXAM:      Vitals:     BP (!) 173/79   Pulse 70   Temp 98.7 °F (37.1 °C) (Oral)   Resp 14   Ht 1.499 m (4' 11\")   Wt 61.2 kg (135 lb)   SpO2 97%   BMI 27.27 kg/m²     General Appearance:    Awake, alert , no acute distress.   Head:    Normocephalic, atraumatic   Eyes:    No pallor, no icterus,   Ears:    No obvious deformity or drainage.   Nose:   No nasal drainage   Throat:   Mucosa moist, no oral thrush   Neck:   Supple, no lymphadenopathy   Back:     no CVA tenderness   Lungs:     Clear to auscultation bilaterally, no wheeze    Heart:    Regular rate and rhythm, no murmur, rub or gallop   Abdomen:     Soft, non-tender, bowel sounds present    Extremities:   No edema, no cyanosis    Pulses:

## 2023-12-26 NOTE — PROGRESS NOTES
Pharmacy Consultation Note  (Antibiotic Dosing and Monitoring)    Initial consult date: 12/25/23  Consulting physician/provider: Luh Grayson  Drug: Vancomycin  Indication: CNS infection    Age/  Gender Height Weight IBW  Allergy Information   73 y.o./female 149.9 cm (4' 11\") 61.2 kg (135 lb)     Ideal body weight: 48.8 kg (107 lb 8.4 oz)  Adjusted ideal body weight: 53.8 kg (118 lb 8.2 oz)   Augmentin [amoxicillin-pot clavulanate]      Renal Function:  Recent Labs     12/24/23  1614 12/25/23  1246 12/26/23  0456   BUN 9 8 8   CREATININE 0.7 0.6 0.6       No intake or output data in the 24 hours ending 12/26/23 1213      Vancomycin Monitoring:  Trough:  No results for input(s): \"VANCOTROUGH\" in the last 72 hours. Random:  No results for input(s): \"VANCORANDOM\" in the last 72 hours. Vancomycin Administration Times:  Recent vancomycin administrations                     vancomycin (VANCOCIN) 1,000 mg in sodium chloride 0.9 % 250 mL IVPB ([id number]) (mg) 1,000 mg New Bag 12/26/23 0513    vancomycin (VANCOCIN) 1,250 mg in sodium chloride 0.9 % 250 mL IVPB (mg) 1,250 mg New Bag 12/25/23 1525                  Assessment:  Patient is a 72 y.o. female who has been initiated on vancomycin  Estimated Creatinine Clearance: 73 mL/min (based on SCr of 0.6 mg/dL). To dose vancomycin, pharmacy will be utilizing  trough levels. Received loading dose in ED 1530- 1250 mg.  12/26: ID (Dr. Aldo Farooq) stopped all ATBs (no infection at this time; it's hallucinations). Plan:  Pharmacy sign off.      Franchesca Mohr, PharmD  12/26/2023  12:15 PM

## 2023-12-26 NOTE — PROGRESS NOTES
Pharmacy Consultation Note  (Antibiotic Dosing and Monitoring)    Initial consult date: 12/25/23  Consulting physician/provider: Robson Torres  Drug: Vancomycin  Indication: CNS infection    Age/  Gender Height Weight IBW  Allergy Information   73 y.o./female 149.9 cm (4' 11\") 61.2 kg (135 lb)     Ideal body weight: 48.8 kg (107 lb 8.4 oz)  Adjusted ideal body weight: 53.8 kg (118 lb 8.2 oz)   Augmentin [amoxicillin-pot clavulanate]      Renal Function:  Recent Labs     12/24/23  1614 12/25/23  1246   BUN 9 8   CREATININE 0.7 0.6       Intake/Output Summary (Last 24 hours) at 12/25/2023 2043  Last data filed at 12/25/2023 1906  Gross per 24 hour   Intake 0 ml   Output --   Net 0 ml       Vancomycin Monitoring:  Trough:  No results for input(s): \"VANCOTROUGH\" in the last 72 hours. Random:  No results for input(s): \"VANCORANDOM\" in the last 72 hours. Vancomycin Administration Times:  Recent vancomycin administrations                     vancomycin (VANCOCIN) 1,250 mg in sodium chloride 0.9 % 250 mL IVPB (mg) 1,250 mg New Bag 12/25/23 1525                    Assessment:  Patient is a 72 y.o. female who has been initiated on vancomycin  Estimated Creatinine Clearance: 73 mL/min (based on SCr of 0.6 mg/dL). To dose vancomycin, pharmacy will be utilizing  trough levels. Received loading dose in ED 1530- 1250 mg.    Plan: Will continue vancomycin 1000 mg IV every 12 hours  Will check vancomycin levels when appropriate  Will continue to monitor renal function   Pharmacy to follow      Zari Estrada, PharmMICH, [facility], BCPS 12/25/2023 8:44 PM
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
## 2023-12-26 NOTE — PROGRESS NOTES
[facility]  Internal Medicine Residency Program  Progress Note - House Team       Patient:  Romana Tanner 73 y.o. female   MRN: [id number]       Date of Service: 12/26/2023    CC: hallucinations and projectile vomiting  Overnight events: 3 episodes of vomiting with no fever and chills     Subjective     Patient was seen today in the morning. Her last emesis episode was this morning. She says her floaters have resolved. No fever, chills, neck rigidity, focal neurological deficits, seizures.        Objective       Physical Exam  Vitals: BP (!) 173/79   Pulse 70   Temp 98.7 °F (37.1 °C) (Oral)   Resp 14   Ht 1.499 m (4' 11\")   Wt 61.2 kg (135 lb)   SpO2 97%   BMI 27.27 kg/m²     I & O - 24hr: No intake/output data recorded.   General Appearance: alert, appears stated age, and cooperative  HEENT:  Head: Normal, normocephalic, atraumatic.  Neck: no carotid bruit, no JVD, and supple, symmetrical, trachea midline  Lung: clear to auscultation bilaterally  Heart: regular rate and rhythm, S1, S2 normal, no murmur, click, rub or gallop  Abdomen: soft, non-tender; bowel sounds normal; no masses,  no organomegaly  Extremities:  extremities normal, atraumatic, no cyanosis or edema  Musculokeletal: No joint swelling, no muscle tenderness. ROM normal in all joints of extremities.   Neurologic: Mental status: Alert, oriented, thought content appropriate    Diet:   ADULT DIET; Regular      Pertinent Labs & Imaging Studies     Labs    CBC with Differential:    Lab Results   Component Value Date/Time    WBC 7.2 12/26/2023 04:56 AM    RBC 4.48 12/26/2023 04:56 AM    HGB 13.5 12/26/2023 04:56 AM    HCT 38.7 12/26/2023 04:56 AM     12/26/2023 04:56 AM    MCV 86.4 12/26/2023 04:56 AM    MCH 30.1 12/26/2023 04:56 AM    MCHC 34.9 12/26/2023 04:56 AM    RDW 12.8 12/26/2023 04:56 AM    LYMPHOPCT 23 12/26/2023 04:56 AM    MONOPCT 5 12/26/2023 04:56 AM    BASOPCT 0 12/26/2023 04:56 AM    MONOSABS 0.38 12/26/2023  CONTRAST   Final Result   No acute intracranial abnormality. XR CHEST PORTABLE   Final Result   No acute process. EKG: normal sinus rhythm, unchanged from previous tracings. Resident's Assessment and Plan       Assessment and Plan:    Visual hallucinations 2/2 meningitis vs SAH vs drug side effects:   - UDS and SDS negative with CT head showing no abnormality. MRI showed leptomeningeal enhancement. S/P LP on 12/25 shows xanthochromia, proteinuria, WBC with monocytic predominance. Meningitis prophylaxis started with acyclovir, ceftriaxone, ampicillin and vancomycin  Plan:   - continue broad spectrum   - follow ID and neurology consult   - zofran for nausea   - follow mentation   - follow CTA venous head W/wo CONTRAST to rule out CVT    2. Hypertension[de-identified]   - was on metoprolol and lisinopril for home meds. We will continue home meds. 3.   Asymptomatic hematuria:   - on UA screen patient had BC, bacteruria and calcium crystals. Patient is asymptomatic. PT/OT evaluation: not indicated at this time   DVT prophylaxis: PCD  GI prophylaxis: not indicated  Diet:   ADULT DIET;  Regular   Bowel regimen: PRN  Pain management: as needed  Code status: Full Code   Disposition: Continue Current Care  Family: updated as available    Shreyas Hewitt MD, PGY-1   Attending physician: Dr. John Magallanes

## 2023-12-26 NOTE — ED NOTES
Puncture site clean and dry. No blood or CSF noted. Pt denies any pain at the site. No tenderness. Pt ambulatory w/o difficulty. Reports improved headache.

## 2023-12-27 PROBLEM — I67.83 POSTERIOR REVERSIBLE ENCEPHALOPATHY SYNDROME: Status: ACTIVE | Noted: 2023-12-27

## 2023-12-27 LAB
ANION GAP SERPL CALCULATED.3IONS-SCNC: 14 MMOL/L (ref 7–16)
BASOPHILS # BLD: 0.04 K/UL (ref 0–0.2)
BASOPHILS NFR BLD: 1 % (ref 0–2)
BUN SERPL-MCNC: 7 MG/DL (ref 6–23)
CA-I BLD-SCNC: 1.18 MMOL/L (ref 1.15–1.33)
CALCIUM SERPL-MCNC: 8.5 MG/DL (ref 8.6–10.2)
CHLORIDE SERPL-SCNC: 101 MMOL/L (ref 98–107)
CO2 SERPL-SCNC: 21 MMOL/L (ref 22–29)
CREAT SERPL-MCNC: 0.5 MG/DL (ref 0.5–1)
EOSINOPHIL # BLD: 0.17 K/UL (ref 0.05–0.5)
EOSINOPHILS RELATIVE PERCENT: 2 % (ref 0–6)
ERYTHROCYTE [DISTWIDTH] IN BLOOD BY AUTOMATED COUNT: 13.1 % (ref 11.5–15)
GFR SERPL CREATININE-BSD FRML MDRD: >60 ML/MIN/1.73M2
GLUCOSE SERPL-MCNC: 95 MG/DL (ref 74–99)
HCT VFR BLD AUTO: 39.5 % (ref 34–48)
HGB BLD-MCNC: 13.7 G/DL (ref 11.5–15.5)
IMM GRANULOCYTES # BLD AUTO: 0.09 K/UL (ref 0–0.58)
IMM GRANULOCYTES NFR BLD: 1 % (ref 0–5)
LYMPHOCYTES NFR BLD: 1.52 K/UL (ref 1.5–4)
LYMPHOCYTES RELATIVE PERCENT: 18 % (ref 20–42)
MCH RBC QN AUTO: 30.3 PG (ref 26–35)
MCHC RBC AUTO-ENTMCNC: 34.7 G/DL (ref 32–34.5)
MCV RBC AUTO: 87.4 FL (ref 80–99.9)
MICROORGANISM SPEC CULT: NO GROWTH
MONOCYTES NFR BLD: 0.49 K/UL (ref 0.1–0.95)
MONOCYTES NFR BLD: 6 % (ref 2–12)
NEUTROPHILS NFR BLD: 73 % (ref 43–80)
NEUTS SEG NFR BLD: 6.08 K/UL (ref 1.8–7.3)
NON-GYN CYTOLOGY REPORT: NORMAL
PLATELET # BLD AUTO: 249 K/UL (ref 130–450)
PMV BLD AUTO: 9.9 FL (ref 7–12)
POTASSIUM SERPL-SCNC: 3.2 MMOL/L (ref 3.5–5)
RBC # BLD AUTO: 4.52 M/UL (ref 3.5–5.5)
SODIUM SERPL-SCNC: 136 MMOL/L (ref 132–146)
SPECIMEN DESCRIPTION: NORMAL
WBC OTHER # BLD: 8.4 K/UL (ref 4.5–11.5)

## 2023-12-27 PROCEDURE — 2580000003 HC RX 258

## 2023-12-27 PROCEDURE — 6370000000 HC RX 637 (ALT 250 FOR IP)

## 2023-12-27 PROCEDURE — 6360000002 HC RX W HCPCS

## 2023-12-27 PROCEDURE — 2060000000 HC ICU INTERMEDIATE R&B

## 2023-12-27 PROCEDURE — 80048 BASIC METABOLIC PNL TOTAL CA: CPT

## 2023-12-27 PROCEDURE — 99233 SBSQ HOSP IP/OBS HIGH 50: CPT | Performed by: INTERNAL MEDICINE

## 2023-12-27 PROCEDURE — 36415 COLL VENOUS BLD VENIPUNCTURE: CPT

## 2023-12-27 PROCEDURE — 82330 ASSAY OF CALCIUM: CPT

## 2023-12-27 PROCEDURE — 99232 SBSQ HOSP IP/OBS MODERATE 35: CPT

## 2023-12-27 PROCEDURE — 85025 COMPLETE CBC W/AUTO DIFF WBC: CPT

## 2023-12-27 RX ORDER — DEXAMETHASONE 4 MG/1
4 TABLET ORAL EVERY 6 HOURS SCHEDULED
Status: COMPLETED | OUTPATIENT
Start: 2023-12-27 | End: 2023-12-28

## 2023-12-27 RX ORDER — POTASSIUM CHLORIDE 20 MEQ/1
40 TABLET, EXTENDED RELEASE ORAL ONCE
Status: COMPLETED | OUTPATIENT
Start: 2023-12-27 | End: 2023-12-27

## 2023-12-27 RX ORDER — 0.9 % SODIUM CHLORIDE 0.9 %
500 INTRAVENOUS SOLUTION INTRAVENOUS ONCE
Status: COMPLETED | OUTPATIENT
Start: 2023-12-27 | End: 2023-12-27

## 2023-12-27 RX ADMIN — Medication 10 ML: at 19:52

## 2023-12-27 RX ADMIN — DEXAMETHASONE 4 MG: 4 TABLET ORAL at 18:30

## 2023-12-27 RX ADMIN — POTASSIUM CHLORIDE 40 MEQ: 1500 TABLET, EXTENDED RELEASE ORAL at 09:07

## 2023-12-27 RX ADMIN — DEXAMETHASONE 4 MG: 4 TABLET ORAL at 14:19

## 2023-12-27 RX ADMIN — LISINOPRIL 5 MG: 10 TABLET ORAL at 09:07

## 2023-12-27 RX ADMIN — PROPRANOLOL HYDROCHLORIDE 60 MG: 60 CAPSULE, EXTENDED RELEASE ORAL at 09:07

## 2023-12-27 RX ADMIN — LABETALOL HYDROCHLORIDE 10 MG: 5 INJECTION, SOLUTION INTRAVENOUS at 01:33

## 2023-12-27 RX ADMIN — SODIUM CHLORIDE 500 ML: 9 INJECTION, SOLUTION INTRAVENOUS at 14:21

## 2023-12-27 RX ADMIN — POTASSIUM BICARBONATE 20 MEQ: 782 TABLET, EFFERVESCENT ORAL at 11:21

## 2023-12-27 RX ADMIN — ONDANSETRON 4 MG: 2 INJECTION INTRAMUSCULAR; INTRAVENOUS at 01:33

## 2023-12-27 RX ADMIN — ONDANSETRON 4 MG: 2 INJECTION INTRAMUSCULAR; INTRAVENOUS at 09:10

## 2023-12-27 ASSESSMENT — PAIN SCALES - GENERAL
PAINLEVEL_OUTOF10: 5
PAINLEVEL_OUTOF10: 0
PAINLEVEL_OUTOF10: 0

## 2023-12-27 ASSESSMENT — PAIN DESCRIPTION - ORIENTATION: ORIENTATION: MID

## 2023-12-27 ASSESSMENT — PAIN DESCRIPTION - PAIN TYPE: TYPE: ACUTE PAIN

## 2023-12-27 ASSESSMENT — PAIN DESCRIPTION - LOCATION: LOCATION: HEAD

## 2023-12-27 ASSESSMENT — PAIN DESCRIPTION - DESCRIPTORS: DESCRIPTORS: ACHING;DISCOMFORT

## 2023-12-27 ASSESSMENT — PAIN DESCRIPTION - ONSET: ONSET: GRADUAL

## 2023-12-27 NOTE — PROGRESS NOTES
Michelle Landis is a 72 y.o. left handed female     Neurology following for visual hallucinations    PMH of hypertension    Assessment:     ЕКАТЕРИНА    Presented with headache and visual hallucinations  BP has been elevated this admission with a blood pressure as high as 184/81  MRI positive for PRESS    Plan:     Blood pressure diary  Blood pressure management per primary  Neurology will sign off call if needed  Follow-up with neurology after discharge  MRI in 3 months, ordered in epic    History of Present Illness:     Patient presented to the ER on 12/24/2023 after experiencing hallucinations and a headache for 3 days. She was seeing letters and numbers, writing on the walls, bright colors especially pink and blue and yellow patterns. She saw a black creature crawling on the wall. She thought her pillow was walking across the bed. Patient was in [place] approximately 2 weeks ago. She was feeling sick and had an appointment with her PCP. On 12/7 she was COVID-positive. She was managed with Medrol Dosepak and Tylenol. She was also treated with Augmentin for sinus infection. She developed a rash and stopped taking the Augmentin. Patient typically gets headaches when her blood pressure was elevated. Subjective:     Patient sitting up in bed watching TV. She has had no further episodes of hallucinations or headaches. She says her blood pressure has been intermittently elevated while visiting her PCP stating that it has been in the 150s and her primary care physician is happy with that reading. She says she has a blood pressure cuff at home and is going to maintain a blood pressure diary and show her primary care physician.     She says when she stands up she becomes nauseous and \"wobbly\"    No chest pain or palpitations  No coughing or wheezing    No vertigo, lightheadedness or loss of consciousness  No falls, tripping or stumbling  No incontinence of bowels or bladder  No itching or bruising appreciated  No numbness, tingling or focal arm/leg weakness    ROS otherwise negative      Objective:       BP (!) 149/65   Pulse 66   Temp 98.2 °F (36.8 °C) (Oral)   Resp 14   Ht 1.499 m (4' 11\")   Wt 61.2 kg (135 lb)   SpO2 94%   BMI 27.27 kg/m²       General appearance: alert, appears stated age, cooperative and no distress  Head: normocephalic, without obvious abnormality, atraumatic  Eyes: conjunctivae/corneas clear  Neck: no adenopathy,  supple, symmetrical, trachea midline and thyroid not enlarged, symmetric, no tenderness/mass/nodules  Lungs: Regular respirations on room air  Heart: No chest pain or palpitations  Abdomen: soft, non-tender; bowel sounds normal; no masses,  no organomegaly  Extremities:  normal, atraumatic, no cyanosis or edema  Pulses: 2+ and symmetric  Skin: color, texture, turgor normal---no rashes or lesions      Mental Status: Alert, oriented, thought content appropriate, alertness: alert, orientation: time, date, person, place, city, affect: normal, thought content exhibits logical connections     Appropriate attention/concentration  Intact fundus of knowledge  Repetition intact  Intact memories      Speech: Clear  Language: No aphasias    Cranial Nerves:  I: smell    II: visual acuity     II: visual fields Full to confrontation   II: pupils ARIAN   III,VII: ptosis None   III,IV,VI: extraocular muscles  Full ROM   V: mastication    V: facial light touch sensation  Normal   V,VII: corneal reflex     VII: facial muscle function - upper  Normal   VII: facial muscle function - lower Normal   VIII: hearing Normal   IX: soft palate elevation     IX,X: gag reflex    XI: trapezius strength  5/5   XI: sternocleidomastoid strength    XI: neck extension strength     XII: tongue strength  Normal     Motor:  5/5 throughout  Normal bulk and tone  No drift   No abnormal movements    Sensory:  LT and PP normal  Vibration normal    Coordination:   FN, FFM and VINI normal  HS

## 2023-12-27 NOTE — PROGRESS NOTES
Department of Internal Medicine  Infectious Diseases  Progress  Note      C/C: ?   Meningitis     Denies fever or chills  Headache +  Visual hallucination improving   Afebrile         Current Facility-Administered Medications   Medication Dose Route Frequency Provider Last Rate Last Admin    dexAMETHasone (DECADRON) tablet 4 mg  4 mg Oral 4 times per day Patricia June MD   4 mg at 12/27/23 1419    sodium chloride 0.9 % bolus 500 mL  500 mL IntraVENous Once Patricia June .9 mL/hr at 12/27/23 1421 500 mL at 12/27/23 1421    propranolol (INDERAL LA) extended release capsule 60 mg  60 mg Oral Daily Cas Correa MD   60 mg at 12/27/23 0907    lisinopril (PRINIVIL;ZESTRIL) tablet 5 mg  5 mg Oral Daily Cas Correa MD   5 mg at 12/27/23 0907    sodium chloride flush 0.9 % injection 5-40 mL  5-40 mL IntraVENous 2 times per day Rima Marley MD   10 mL at 12/26/23 2127    sodium chloride flush 0.9 % injection 5-40 mL  5-40 mL IntraVENous PRN Rima Marley MD        0.9 % sodium chloride infusion   IntraVENous PRN Rima Marley MD        ondansetron (ZOFRAN-ODT) disintegrating tablet 4 mg  4 mg Oral Q8H PRN Rima Marley MD        Or    ondansetron (ZOFRAN) injection 4 mg  4 mg IntraVENous Q6H PRN Rmia Marley MD   4 mg at 12/27/23 0910    polyethylene glycol (GLYCOLAX) packet 17 g  17 g Oral Daily PRN Rima Marley MD        glucose chewable tablet 16 g  4 tablet Oral PRN Rima Marley MD        dextrose bolus 10% 125 mL  125 mL IntraVENous PRN Rima Marley MD        Or    dextrose bolus 10% 250 mL  250 mL IntraVENous PRN Rima Marley MD        glucagon injection 1 mg  1 mg SubCUTAneous PRN Rima Marley MD        dextrose 10 % infusion   IntraVENous Continuous PRN Rima Marley MD        hydrALAZINE (APRESOLINE) injection 10 mg  10 mg IntraVENous Q6H PRN Rima Marley MD   10 mg at 12/26/23 2131    labetalol  12/27/2023 05:50 AM    LYMPHSABS 1.52 12/27/2023 05:50 AM    EOSABS 0.17 12/27/2023 05:50 AM    BASOSABS 0.04 12/27/2023 05:50 AM       CMP     Lab Results   Component Value Date/Time     12/27/2023 05:50 AM    K 3.2 12/27/2023 05:50 AM     12/27/2023 05:50 AM    CO2 21 12/27/2023 05:50 AM    BUN 7 12/27/2023 05:50 AM    CREATININE 0.5 12/27/2023 05:50 AM    LABGLOM >60 12/27/2023 05:50 AM    GLUCOSE 95 12/27/2023 05:50 AM    PROT 6.7 12/24/2023 04:14 PM    LABALBU 4.0 12/24/2023 04:14 PM    CALCIUM 8.5 12/27/2023 05:50 AM    BILITOT 0.3 12/24/2023 04:14 PM    ALKPHOS 86 12/24/2023 04:14 PM    AST 14 12/24/2023 04:14 PM    ALT 11 12/24/2023 04:14 PM         Hepatic Function Panel:    Lab Results   Component Value Date/Time    ALKPHOS 86 12/24/2023 04:14 PM    ALT 11 12/24/2023 04:14 PM    AST 14 12/24/2023 04:14 PM    PROT 6.7 12/24/2023 04:14 PM    BILITOT 0.3 12/24/2023 04:14 PM    LABALBU 4.0 12/24/2023 04:14 PM       PT/INR:  No results found for: \"PROTIME\", \"INR\"    TSH:  No results found for: \"TSH\"    U/A:    Lab Results   Component Value Date/Time    COLORU Yellow 12/24/2023 04:14 PM    PHUR 6.5 12/24/2023 04:14 PM    WBCUA 0 TO 5 12/24/2023 04:14 PM    RBCUA 3 to 5 12/24/2023 04:14 PM    BACTERIA 2+ 12/24/2023 04:14 PM    SPECGRAV 1.010 12/24/2023 04:14 PM    LEUKOCYTESUR SMALL 12/24/2023 04:14 PM    UROBILINOGEN 0.2 12/24/2023 04:14 PM    BILIRUBINUR NEGATIVE 12/24/2023 04:14 PM    GLUCOSEU NEGATIVE 12/24/2023 04:14 PM       ABG:  No results found for: \"BJO9URS\", \"BEART\", \"I6BQUFCO\", \"PHART\", \"THGBART\", \"MMS4WAR\", \"PO2ART\", \"WKB9GSY\"    MICROBIOLOGY:    Resp panel - neg        Volume, CSF . SPINAL FLUID mL     Supernatant Color, CSF Xanthochromia    Appearance, CSF Clear    WBC, CSF 63 High  cells/uL    RBC, CSF <2000 cells/uL    Comment: No normal range established.        Tube Number, CSF 1    Clot Check NONE SEEN   CSF DIFFERENTIAL [[id number]] (Abnormal)    Collected: 12/25/23 1115    Updated:

## 2023-12-28 VITALS
HEART RATE: 62 BPM | OXYGEN SATURATION: 97 % | RESPIRATION RATE: 18 BRPM | HEIGHT: 59 IN | TEMPERATURE: 97.3 F | BODY MASS INDEX: 27.21 KG/M2 | WEIGHT: 135 LBS | DIASTOLIC BLOOD PRESSURE: 72 MMHG | SYSTOLIC BLOOD PRESSURE: 150 MMHG

## 2023-12-28 LAB
ANION GAP SERPL CALCULATED.3IONS-SCNC: 15 MMOL/L (ref 7–16)
BASOPHILS # BLD: 0.02 K/UL (ref 0–0.2)
BASOPHILS NFR BLD: 0 % (ref 0–2)
BUN SERPL-MCNC: 11 MG/DL (ref 6–23)
CALCIUM SERPL-MCNC: 9.3 MG/DL (ref 8.6–10.2)
CHLORIDE SERPL-SCNC: 103 MMOL/L (ref 98–107)
CO2 SERPL-SCNC: 21 MMOL/L (ref 22–29)
CREAT SERPL-MCNC: 0.6 MG/DL (ref 0.5–1)
EOSINOPHIL # BLD: 0 K/UL (ref 0.05–0.5)
EOSINOPHILS RELATIVE PERCENT: 0 % (ref 0–6)
ERYTHROCYTE [DISTWIDTH] IN BLOOD BY AUTOMATED COUNT: 13.2 % (ref 11.5–15)
GFR SERPL CREATININE-BSD FRML MDRD: >60 ML/MIN/1.73M2
GLUCOSE SERPL-MCNC: 112 MG/DL (ref 74–99)
HCT VFR BLD AUTO: 38.3 % (ref 34–48)
HGB BLD-MCNC: 13.4 G/DL (ref 11.5–15.5)
IMM GRANULOCYTES # BLD AUTO: 0.09 K/UL (ref 0–0.58)
IMM GRANULOCYTES NFR BLD: 1 % (ref 0–5)
LYMPHOCYTES NFR BLD: 1.37 K/UL (ref 1.5–4)
LYMPHOCYTES RELATIVE PERCENT: 18 % (ref 20–42)
MCH RBC QN AUTO: 30.4 PG (ref 26–35)
MCHC RBC AUTO-ENTMCNC: 35 G/DL (ref 32–34.5)
MCV RBC AUTO: 86.8 FL (ref 80–99.9)
MONOCYTES NFR BLD: 0.15 K/UL (ref 0.1–0.95)
MONOCYTES NFR BLD: 2 % (ref 2–12)
NEUTROPHILS NFR BLD: 79 % (ref 43–80)
NEUTS SEG NFR BLD: 6.06 K/UL (ref 1.8–7.3)
PLATELET # BLD AUTO: 273 K/UL (ref 130–450)
PMV BLD AUTO: 10.1 FL (ref 7–12)
POTASSIUM SERPL-SCNC: 4.4 MMOL/L (ref 3.5–5)
RBC # BLD AUTO: 4.41 M/UL (ref 3.5–5.5)
SODIUM SERPL-SCNC: 139 MMOL/L (ref 132–146)
WBC OTHER # BLD: 7.7 K/UL (ref 4.5–11.5)

## 2023-12-28 PROCEDURE — 6360000002 HC RX W HCPCS

## 2023-12-28 PROCEDURE — 2580000003 HC RX 258

## 2023-12-28 PROCEDURE — 6370000000 HC RX 637 (ALT 250 FOR IP)

## 2023-12-28 PROCEDURE — 36415 COLL VENOUS BLD VENIPUNCTURE: CPT

## 2023-12-28 PROCEDURE — 80048 BASIC METABOLIC PNL TOTAL CA: CPT

## 2023-12-28 PROCEDURE — 99239 HOSP IP/OBS DSCHRG MGMT >30: CPT | Performed by: INTERNAL MEDICINE

## 2023-12-28 PROCEDURE — 85025 COMPLETE CBC W/AUTO DIFF WBC: CPT

## 2023-12-28 RX ORDER — ONDANSETRON 4 MG/1
4 TABLET, FILM COATED ORAL 3 TIMES DAILY
Qty: 15 TABLET | Refills: 3 | Status: CANCELLED | OUTPATIENT
Start: 2023-12-28

## 2023-12-28 RX ORDER — DEXAMETHASONE 1 MG
1 TABLET ORAL 3 TIMES DAILY
Qty: 15 TABLET | Refills: 0 | Status: CANCELLED | OUTPATIENT
Start: 2023-12-28 | End: 2024-01-02

## 2023-12-28 RX ORDER — DEXAMETHASONE 1 MG
1 TABLET ORAL 3 TIMES DAILY
Qty: 15 TABLET | Refills: 0 | Status: SHIPPED | OUTPATIENT
Start: 2023-12-28 | End: 2024-01-02

## 2023-12-28 RX ORDER — LISINOPRIL 10 MG/1
10 TABLET ORAL DAILY
Qty: 30 TABLET | Refills: 3 | Status: CANCELLED | OUTPATIENT
Start: 2023-12-29

## 2023-12-28 RX ORDER — PROPRANOLOL HCL 60 MG
60 CAPSULE, EXTENDED RELEASE 24HR ORAL DAILY
Qty: 30 CAPSULE | Refills: 3 | Status: CANCELLED | OUTPATIENT
Start: 2023-12-29

## 2023-12-28 RX ORDER — LISINOPRIL 10 MG/1
10 TABLET ORAL DAILY
Status: DISCONTINUED | OUTPATIENT
Start: 2023-12-29 | End: 2023-12-28 | Stop reason: HOSPADM

## 2023-12-28 RX ORDER — LISINOPRIL 10 MG/1
10 TABLET ORAL DAILY
Qty: 30 TABLET | Refills: 2 | Status: SHIPPED | OUTPATIENT
Start: 2023-12-29

## 2023-12-28 RX ORDER — PROPRANOLOL HCL 60 MG
60 CAPSULE, EXTENDED RELEASE 24HR ORAL DAILY
Qty: 30 CAPSULE | Refills: 3 | Status: SHIPPED | OUTPATIENT
Start: 2023-12-29

## 2023-12-28 RX ORDER — ONDANSETRON 4 MG/1
4 TABLET, ORALLY DISINTEGRATING ORAL EVERY 8 HOURS PRN
Qty: 15 TABLET | Refills: 2 | Status: SHIPPED | OUTPATIENT
Start: 2023-12-28

## 2023-12-28 RX ADMIN — DEXAMETHASONE 4 MG: 4 TABLET ORAL at 05:41

## 2023-12-28 RX ADMIN — Medication 10 ML: at 07:55

## 2023-12-28 RX ADMIN — PROPRANOLOL HYDROCHLORIDE 60 MG: 60 CAPSULE, EXTENDED RELEASE ORAL at 07:54

## 2023-12-28 RX ADMIN — LISINOPRIL 5 MG: 10 TABLET ORAL at 07:53

## 2023-12-28 RX ADMIN — DEXAMETHASONE 4 MG: 4 TABLET ORAL at 00:15

## 2023-12-28 ASSESSMENT — PAIN SCALES - GENERAL: PAINLEVEL_OUTOF10: 0

## 2023-12-28 NOTE — DISCHARGE SUMMARY
[address]  Discharge Summary    PCP: No primary care provider on file. Admit Date:12/24/2023  Discharge Date: 12/28/2023    Admission Diagnosis:   Visual hallucinations 2/2 meningitis vs SAH vs drug side effects:  Hypertension  Asymptomatic hematuria    Discharge Diagnosis:  Visual hallucinations 2/2 PRES  Hypertension  Asymptomatic hematuria      Hospital Course: The patient is a 72 y.o. female , presented with visual hallucinations, HA, feeling tired x 3 days. Pt believes it may be caused by Augmentin that she was prescribed for a sinus infection as the hallucinations started after. Pt also had vomiting, abdominal pain and diarrhea when she took it. [facility] Stopped taking Augmentin 12/24. Visual hallucinations consist of writing on the walls, bright colors, words, numbers, and designs, she has not experienced this before, knows the hallucinations are not real. Does not have mental illness history. Recent travel to [facility] for 2 weeks, felt sick on the way back home December 5th, subsequently tested positive for COVID-19 December 7th, was given a Z-pack. Occasional drinker, 1-2 beers weekly. Works at [employer]. Not a smoker. No drug use. No fevers, chills, CP, SOB, cough, N/V/D since friday, no abdominal pain, no SI/HI, no auditory hallucinations. ED Course: Pt hemodynamically stable, afebrile. Concern for meningitis, LP performed, CSF studies sent. Pt tolerated well. Consulted ID and Neurology. Started on empiric antibiotic coverage vanc, rocephin, ampicillin and acyclovir. MRI suggested leptomeningeal enhancement left occipitoparietal lobe - Flair change. LP showed xanthochromia with turbid CSF and RBC<2000. MRV was negative for superior sagittal vein thrombosis. Pt admitted to house medicine for further evaluation and treatment. Neurology and ID was consulted . Meningitis was ruled out after negative CSF cultures and viral panel. Meningitis prophylaxis was stopped. PRES was diagnosed as per neurology and MRI findings.and patient was started on decadron. Her symptoms improved and she was discharged on low dose decadron for 5 days, zofran for nausea and lisinopril was increased from 5 mg to 10 mg for better control of BP.        Significant findings (history and exam, laboratory, radiological, pathology, other tests):   General Appearance: alert, appears stated age, and cooperative  HEENT:  Head: Normal, normocephalic, atraumatic.  Neck: no carotid bruit, no JVD, and supple, symmetrical, trachea midline  Lung: clear to auscultation bilaterally  Heart: regular rate and rhythm, S1, S2 normal, no murmur, click, rub or gallop  Abdomen: soft, non-tender; bowel sounds normal; no masses,  no organomegaly  Extremities:  extremities normal, atraumatic, no cyanosis or edema  Musculokeletal: No joint swelling, no muscle tenderness. ROM normal in all joints of extremities.   Neurologic: Mental status: Alert, oriented, thought content appropriate      Pending test results: none    Consults:  Infectious disease  neurology    Procedures:  Lumbar puncture    Condition at discharge: Stable    Disposition: home    Discharge Medications:  Current Discharge Medication List        START taking these medications    Details   ondansetron (ZOFRAN-ODT) 4 MG disintegrating tablet Take 1 tablet by mouth every 8 hours as needed for Nausea or Vomiting  Qty: 15 tablet, Refills: 2      lisinopril (PRINIVIL;ZESTRIL) 10 MG tablet Take 1 tablet by mouth daily  Qty: 30 tablet, Refills: 2      propranolol (INDERAL LA) 60 MG extended release capsule Take 1 capsule by mouth daily  Qty: 30 capsule, Refills: 3      dexAMETHasone (DECADRON) 1 MG tablet Take 1 tablet by mouth 3 times daily for 5 days  Qty: 15 tablet, Refills: 0             Activity: activity as tolerated  Diet: regular diet    Follow-up appointments:   Please follow up with your primary care physician ( @PCP@)

## 2023-12-28 NOTE — PROGRESS NOTES
Department of Internal Medicine  Infectious Diseases  Progress  Note      C/C: ?   Meningitis     Denies fever or chills  Headache - better   Afebrile         Current Facility-Administered Medications   Medication Dose Route Frequency Provider Last Rate Last Admin    [START ON 12/29/2023] lisinopril (PRINIVIL;ZESTRIL) tablet 10 mg  10 mg Oral Daily Patricia June MD        propranolol (INDERAL LA) extended release capsule 60 mg  60 mg Oral Daily Cas Correa MD   60 mg at 12/28/23 0754    sodium chloride flush 0.9 % injection 5-40 mL  5-40 mL IntraVENous 2 times per day Rima Marley MD   10 mL at 12/28/23 0755    sodium chloride flush 0.9 % injection 5-40 mL  5-40 mL IntraVENous PRN Rima Marley MD        0.9 % sodium chloride infusion   IntraVENous PRN Rima Marley MD        ondansetron (ZOFRAN-ODT) disintegrating tablet 4 mg  4 mg Oral Q8H PRN Rima Marley MD        Or    ondansetron (ZOFRAN) injection 4 mg  4 mg IntraVENous Q6H PRN Rima Marley MD   4 mg at 12/27/23 0910    polyethylene glycol (GLYCOLAX) packet 17 g  17 g Oral Daily PRN Rima Marley MD        glucose chewable tablet 16 g  4 tablet Oral PRN Rima Marley MD        dextrose bolus 10% 125 mL  125 mL IntraVENous PRN Rima Marley MD        Or    dextrose bolus 10% 250 mL  250 mL IntraVENous PRN Rima Marley MD        glucagon injection 1 mg  1 mg SubCUTAneous PRN Rima Marley MD        dextrose 10 % infusion   IntraVENous Continuous PRN Rima Marley MD        hydrALAZINE (APRESOLINE) injection 10 mg  10 mg IntraVENous Q6H PRN Rima Marley MD   10 mg at 12/26/23 2131    labetalol (NORMODYNE;TRANDATE) injection 10 mg  10 mg IntraVENous Q6H PRN Pete Darnell MD   10 mg at 12/27/23 0133         REVIEW OF SYSTEMS:    CONSTITUTIONAL:  Denies fever, chill or rigors.  HEENT: mild headache   RESPIRATORY: denies cough, shortness of breath,  06:06 AM    CO2 21 12/28/2023 06:06 AM    BUN 11 12/28/2023 06:06 AM    CREATININE 0.6 12/28/2023 06:06 AM    LABGLOM >60 12/28/2023 06:06 AM    GLUCOSE 112 12/28/2023 06:06 AM    PROT 6.7 12/24/2023 04:14 PM    LABALBU 4.0 12/24/2023 04:14 PM    CALCIUM 9.3 12/28/2023 06:06 AM    BILITOT 0.3 12/24/2023 04:14 PM    ALKPHOS 86 12/24/2023 04:14 PM    AST 14 12/24/2023 04:14 PM    ALT 11 12/24/2023 04:14 PM         Hepatic Function Panel:    Lab Results   Component Value Date/Time    ALKPHOS 86 12/24/2023 04:14 PM    ALT 11 12/24/2023 04:14 PM    AST 14 12/24/2023 04:14 PM    PROT 6.7 12/24/2023 04:14 PM    BILITOT 0.3 12/24/2023 04:14 PM    LABALBU 4.0 12/24/2023 04:14 PM       PT/INR:  No results found for: \"PROTIME\", \"INR\"    TSH:  No results found for: \"TSH\"    U/A:    Lab Results   Component Value Date/Time    COLORU Yellow 12/24/2023 04:14 PM    PHUR 6.5 12/24/2023 04:14 PM    WBCUA 0 TO 5 12/24/2023 04:14 PM    RBCUA 3 to 5 12/24/2023 04:14 PM    BACTERIA 2+ 12/24/2023 04:14 PM    SPECGRAV 1.010 12/24/2023 04:14 PM    LEUKOCYTESUR SMALL 12/24/2023 04:14 PM    UROBILINOGEN 0.2 12/24/2023 04:14 PM    BILIRUBINUR NEGATIVE 12/24/2023 04:14 PM    GLUCOSEU NEGATIVE 12/24/2023 04:14 PM       ABG:  No results found for: \"RER0EKO\", \"BEART\", \"P6YHKILB\", \"PHART\", \"THGBART\", \"WCY1MCX\", \"PO2ART\", \"NJM2EDD\"    MICROBIOLOGY:    Resp panel - neg        Volume, CSF . SPINAL FLUID mL     Supernatant Color, CSF Xanthochromia    Appearance, CSF Clear    WBC, CSF 63 High  cells/uL    RBC, CSF <2000 cells/uL    Comment: No normal range established. Tube Number, CSF 1    Clot Check NONE SEEN   CSF DIFFERENTIAL [[id number]] (Abnormal)    Collected: 12/25/23 1115    Updated: 12/26/23 0614     Neutrophils, CSF 5 %    Comment: Neutrophils, fluid = Polymorphonuclear leukocytes.        Monocytes, CSF 95 High  %    Comment: Mono/Macrophage, fluid includes all Mononuclear cell        Protein  197  Glucose 45          Specimen: CSF

## 2023-12-28 NOTE — CARE COORDINATION
CM Update: Discharge order noted. Met with patient at bedside to discuss transition of care plan. Discharge plan is home with no home health care needs. Sister will transport. Came to hospital with visual hallucination. Found to have no Meningitis. CM/FAUSTO to follow.  MT

## 2023-12-28 NOTE — PLAN OF CARE
Problem: Discharge Planning  Goal: Discharge to home or other facility with appropriate resources  12/28/2023 0746 by Quintin Reyna RN  Outcome: Progressing  12/27/2023 2051 by Ruthy Mendoza  Outcome: Progressing     Problem: Pain  Goal: Verbalizes/displays adequate comfort level or baseline comfort level  12/28/2023 0746 by Quintin Reyna RN  Outcome: Progressing  12/27/2023 2051 by Ruthy Mendoza  Outcome: Progressing

## 2023-12-28 NOTE — DISCHARGE INSTRUCTIONS
Internal medicine    Follow ups  Please follow up with your primary care physician ( [email or web address]) within 10 days of discharge from hospital. Please call as soon as possible to make an appointment. Please contact the internal medicine clinic for an appointment if you are unable to get an appointment with your PCP. Please keep all other follow up appointments: Follow up with neurology after discharge, repeat MRI in 3 months   [facility]  Tuyet Alatorre, [address] [facility]  [address] [place]: [phone]  [facility]: [phone]    Changes in healthcare   Please take all medications as indicated  Diet: regular diet   Activity: activity as tolerated  New Medications started during this hospital stay  Decadron 1 mg Three times a day for 5 days  Zofran 6 hourly PRN as needed for nausea  Changes to your medications  Lisinopril 10 mg daily for blood pressure  Medications you should stop taking   none  Additional labs, testing or imaging needed after discharge   none    Please contact us if you have any concerns, wish to change or make an appointment:  Internal medicine clinic   Phone: [phone]  Fax: [phone]  [address] [place] [address]  Or please call the nurse line at [phone]. Should you have further questions in regards to this visit, you can review your clinical note and after visit summary document on your [facility] account. Other questions can be directed to our nurse line at [phone]. Other than any new prescriptions given to you today, the list of home medications on this After Visit Summary are based on information provided to us from you and your healthcare providers. This information, including the list, dose, and frequency of medications is only as accurate as the information you provided. If you have any questions or concerns about your home medications, please contact your Primary Care Physician for further clarification.

## 2023-12-29 LAB — VDRL CSF QL: NONREACTIVE

## 2023-12-30 LAB
MICROORGANISM SPEC CULT: NORMAL
MICROORGANISM SPEC CULT: NORMAL
SERVICE CMNT-IMP: NORMAL
SERVICE CMNT-IMP: NORMAL
SPECIMEN DESCRIPTION: NORMAL
SPECIMEN DESCRIPTION: NORMAL

## 2023-12-31 LAB
MICROORGANISM SPEC CULT: NORMAL
MICROORGANISM/AGENT SPEC: NORMAL
SPECIMEN DESCRIPTION: NORMAL

## 2024-01-02 ENCOUNTER — APPOINTMENT (OUTPATIENT)
Dept: CT IMAGING | Age: 74
End: 2024-01-02
Payer: MEDICARE

## 2024-01-02 ENCOUNTER — HOSPITAL ENCOUNTER (EMERGENCY)
Age: 74
Discharge: HOME | End: 2024-01-02
Attending: EMERGENCY MEDICINE
Payer: MEDICARE

## 2024-01-02 VITALS
TEMPERATURE: 98.1 F | SYSTOLIC BLOOD PRESSURE: 147 MMHG | OXYGEN SATURATION: 96 % | HEART RATE: 64 BPM | RESPIRATION RATE: 20 BRPM | DIASTOLIC BLOOD PRESSURE: 64 MMHG

## 2024-01-02 DIAGNOSIS — R44.3 HALLUCINATIONS: Primary | ICD-10-CM

## 2024-01-02 LAB
ALBUMIN SERPL-MCNC: 4.1 G/DL (ref 3.5–5.2)
ALP SERPL-CCNC: 81 U/L (ref 35–104)
ALT SERPL-CCNC: 13 U/L (ref 0–32)
ANION GAP SERPL CALCULATED.3IONS-SCNC: 8 MMOL/L (ref 7–16)
AST SERPL-CCNC: 12 U/L (ref 0–31)
BASOPHILS # BLD: 0.04 K/UL (ref 0–0.2)
BASOPHILS NFR BLD: 0 % (ref 0–2)
BILIRUB SERPL-MCNC: 0.3 MG/DL (ref 0–1.2)
BILIRUB UR QL STRIP: NEGATIVE
BUN SERPL-MCNC: 19 MG/DL (ref 6–23)
CALCIUM SERPL-MCNC: 8.9 MG/DL (ref 8.6–10.2)
CHLORIDE SERPL-SCNC: 104 MMOL/L (ref 98–107)
CLARITY UR: CLEAR
CO2 SERPL-SCNC: 29 MMOL/L (ref 22–29)
COLOR UR: YELLOW
CREAT SERPL-MCNC: 1.1 MG/DL (ref 0.5–1)
EOSINOPHIL # BLD: 0.06 K/UL (ref 0.05–0.5)
EOSINOPHILS RELATIVE PERCENT: 0 % (ref 0–6)
ERYTHROCYTE [DISTWIDTH] IN BLOOD BY AUTOMATED COUNT: 14.4 % (ref 11.5–15)
GFR SERPL CREATININE-BSD FRML MDRD: 56 ML/MIN/1.73M2
GLUCOSE SERPL-MCNC: 106 MG/DL (ref 74–99)
GLUCOSE UR STRIP-MCNC: NEGATIVE MG/DL
HCT VFR BLD AUTO: 39.4 % (ref 34–48)
HGB BLD-MCNC: 13.1 G/DL (ref 11.5–15.5)
HGB UR QL STRIP.AUTO: ABNORMAL
IMM GRANULOCYTES # BLD AUTO: 0.24 K/UL (ref 0–0.58)
IMM GRANULOCYTES NFR BLD: 2 % (ref 0–5)
KETONES UR STRIP-MCNC: ABNORMAL MG/DL
LEUKOCYTE ESTERASE UR QL STRIP: NEGATIVE
LYMPHOCYTES NFR BLD: 2.4 K/UL (ref 1.5–4)
LYMPHOCYTES RELATIVE PERCENT: 17 % (ref 20–42)
MCH RBC QN AUTO: 30.8 PG (ref 26–35)
MCHC RBC AUTO-ENTMCNC: 33.2 G/DL (ref 32–34.5)
MCV RBC AUTO: 92.5 FL (ref 80–99.9)
MICROORGANISM SPEC CULT: NO GROWTH
MICROORGANISM/AGENT SPEC: NORMAL
MONOCYTES NFR BLD: 0.79 K/UL (ref 0.1–0.95)
MONOCYTES NFR BLD: 6 % (ref 2–12)
NEUTROPHILS NFR BLD: 76 % (ref 43–80)
NEUTS SEG NFR BLD: 10.89 K/UL (ref 1.8–7.3)
NITRITE UR QL STRIP: NEGATIVE
PH UR STRIP: 6.5 [PH] (ref 5–9)
PLATELET # BLD AUTO: 271 K/UL (ref 130–450)
PMV BLD AUTO: 9.9 FL (ref 7–12)
POTASSIUM SERPL-SCNC: 4.3 MMOL/L (ref 3.5–5)
PROT SERPL-MCNC: 6.6 G/DL (ref 6.4–8.3)
PROT UR STRIP-MCNC: NEGATIVE MG/DL
RBC # BLD AUTO: 4.26 M/UL (ref 3.5–5.5)
RBC #/AREA URNS HPF: ABNORMAL /HPF
SODIUM SERPL-SCNC: 141 MMOL/L (ref 132–146)
SP GR UR STRIP: 1.02 (ref 1–1.03)
SPECIMEN DESCRIPTION: NORMAL
TROPONIN I SERPL HS-MCNC: <6 NG/L (ref 0–9)
UROBILINOGEN UR STRIP-ACNC: 0.2 EU/DL (ref 0–1)
WBC #/AREA URNS HPF: ABNORMAL /HPF
WBC OTHER # BLD: 14.4 K/UL (ref 4.5–11.5)

## 2024-01-02 PROCEDURE — 85025 COMPLETE CBC W/AUTO DIFF WBC: CPT

## 2024-01-02 PROCEDURE — 99284 EMERGENCY DEPT VISIT MOD MDM: CPT

## 2024-01-02 PROCEDURE — 93005 ELECTROCARDIOGRAM TRACING: CPT

## 2024-01-02 PROCEDURE — 84484 ASSAY OF TROPONIN QUANT: CPT

## 2024-01-02 PROCEDURE — 70450 CT HEAD/BRAIN W/O DYE: CPT

## 2024-01-02 PROCEDURE — 80053 COMPREHEN METABOLIC PANEL: CPT

## 2024-01-02 PROCEDURE — 81001 URINALYSIS AUTO W/SCOPE: CPT

## 2024-01-03 LAB
EKG ATRIAL RATE: 63 BPM
EKG P AXIS: 46 DEGREES
EKG P-R INTERVAL: 126 MS
EKG Q-T INTERVAL: 398 MS
EKG QRS DURATION: 68 MS
EKG QTC CALCULATION (BAZETT): 407 MS
EKG R AXIS: 45 DEGREES
EKG T AXIS: 38 DEGREES
EKG VENTRICULAR RATE: 63 BPM

## 2024-01-03 PROCEDURE — 93010 ELECTROCARDIOGRAM REPORT: CPT | Performed by: INTERNAL MEDICINE

## 2024-01-03 NOTE — ED PROVIDER NOTES
[address]  [address]  [facility] [address]  [phone]  Schedule an appointment as soon as possible for a visit in 1 day      Charlie Alex MD  [address]  [facility] [address]  [phone]    Schedule an appointment as soon as possible for a visit         DISCHARGE MEDICATIONS:  Discharge Medication List as of 1/2/2024  9:20 PM               (Please note that portions of this note were completed with a voice recognition program.  Efforts were made to edit the dictations but occasionally words are mis-transcribed.)    Karl Escobedo DO (electronically signed)

## 2024-01-03 NOTE — DISCHARGE INSTRUCTIONS
Follow-up with your referred family physician, cardiologist, and neurologist return to the emergency department if your symptoms persist or worsen.    Continue taking previous prescribed medications.

## 2024-02-01 ENCOUNTER — OFFICE VISIT (OUTPATIENT)
Dept: NEUROLOGY | Age: 74
End: 2024-02-01
Payer: MEDICARE

## 2024-02-01 VITALS
WEIGHT: 132 LBS | OXYGEN SATURATION: 94 % | BODY MASS INDEX: 26.66 KG/M2 | HEART RATE: 65 BPM | DIASTOLIC BLOOD PRESSURE: 71 MMHG | SYSTOLIC BLOOD PRESSURE: 136 MMHG | TEMPERATURE: 97.2 F

## 2024-02-01 DIAGNOSIS — I67.83 POSTERIOR REVERSIBLE ENCEPHALOPATHY SYNDROME: Primary | ICD-10-CM

## 2024-02-01 PROCEDURE — 99215 OFFICE O/P EST HI 40 MIN: CPT | Performed by: CLINICAL NURSE SPECIALIST

## 2024-02-01 PROCEDURE — 1123F ACP DISCUSS/DSCN MKR DOCD: CPT | Performed by: CLINICAL NURSE SPECIALIST

## 2024-02-01 RX ORDER — LOSARTAN POTASSIUM AND HYDROCHLOROTHIAZIDE 12.5; 5 MG/1; MG/1
1 TABLET ORAL DAILY
COMMUNITY
Start: 2024-01-04
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
## 2024-05-01 ENCOUNTER — HOSPITAL ENCOUNTER (OUTPATIENT)
Dept: MRI IMAGING | Age: 74
Discharge: HOME | End: 2024-05-03
Payer: MEDICARE

## 2024-05-01 DIAGNOSIS — I67.83 POSTERIOR REVERSIBLE ENCEPHALOPATHY SYNDROME: ICD-10-CM

## 2024-05-01 PROCEDURE — 70551 MRI BRAIN STEM W/O DYE: CPT

## 2024-05-20 ENCOUNTER — OFFICE VISIT (OUTPATIENT)
Dept: NEUROLOGY | Age: 74
End: 2024-05-20
Payer: MEDICARE

## 2024-05-20 VITALS
BODY MASS INDEX: 26.66 KG/M2 | OXYGEN SATURATION: 97 % | SYSTOLIC BLOOD PRESSURE: 126 MMHG | TEMPERATURE: 97.8 F | HEART RATE: 71 BPM | DIASTOLIC BLOOD PRESSURE: 68 MMHG | WEIGHT: 132 LBS

## 2024-05-20 DIAGNOSIS — I67.83 POSTERIOR REVERSIBLE ENCEPHALOPATHY SYNDROME: Primary | ICD-10-CM

## 2024-05-20 PROCEDURE — 1123F ACP DISCUSS/DSCN MKR DOCD: CPT | Performed by: CLINICAL NURSE SPECIALIST

## 2024-05-20 PROCEDURE — 99214 OFFICE O/P EST MOD 30 MIN: CPT | Performed by: CLINICAL NURSE SPECIALIST
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
## 2024-12-17 ENCOUNTER — TELEPHONE (OUTPATIENT)
Dept: ADMINISTRATIVE | Age: 74
End: 2024-12-17

## 2024-12-17 NOTE — TELEPHONE ENCOUNTER
Pt missed call, also asked if [facility] wants her to have MRI before yearly May appt?   Call on home phone now.

## 2025-05-13 ENCOUNTER — OFFICE VISIT (OUTPATIENT)
Dept: NEUROLOGY | Age: 75
End: 2025-05-13
Payer: MEDICARE

## 2025-05-13 VITALS
WEIGHT: 135 LBS | HEIGHT: 59 IN | HEART RATE: 67 BPM | OXYGEN SATURATION: 97 % | SYSTOLIC BLOOD PRESSURE: 130 MMHG | DIASTOLIC BLOOD PRESSURE: 72 MMHG | BODY MASS INDEX: 27.21 KG/M2 | RESPIRATION RATE: 18 BRPM

## 2025-05-13 DIAGNOSIS — I67.83 POSTERIOR REVERSIBLE ENCEPHALOPATHY SYNDROME: Primary | ICD-10-CM

## 2025-05-13 PROCEDURE — 1123F ACP DISCUSS/DSCN MKR DOCD: CPT | Performed by: CLINICAL NURSE SPECIALIST

## 2025-05-13 PROCEDURE — 1159F MED LIST DOCD IN RCRD: CPT | Performed by: CLINICAL NURSE SPECIALIST

## 2025-05-13 PROCEDURE — 99214 OFFICE O/P EST MOD 30 MIN: CPT | Performed by: CLINICAL NURSE SPECIALIST
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
